# Patient Record
Sex: FEMALE | Race: WHITE | Employment: FULL TIME | URBAN - METROPOLITAN AREA
[De-identification: names, ages, dates, MRNs, and addresses within clinical notes are randomized per-mention and may not be internally consistent; named-entity substitution may affect disease eponyms.]

---

## 2017-01-03 ENCOUNTER — INITIAL PRENATAL (OUTPATIENT)
Dept: OBGYN | Facility: CLINIC | Age: 37
End: 2017-01-03
Payer: MEDICAID

## 2017-01-03 VITALS
HEIGHT: 63 IN | SYSTOLIC BLOOD PRESSURE: 114 MMHG | WEIGHT: 196 LBS | DIASTOLIC BLOOD PRESSURE: 62 MMHG | BODY MASS INDEX: 34.73 KG/M2

## 2017-01-03 DIAGNOSIS — Z34.82 ENCOUNTER FOR SUPERVISION OF OTHER NORMAL PREGNANCY IN SECOND TRIMESTER: Primary | ICD-10-CM

## 2017-01-03 LAB
APPEARANCE UR: NORMAL
BILIRUB UR STRIP-MCNC: NORMAL MG/DL
COLOR UR AUTO: NORMAL
GLUCOSE UR STRIP.AUTO-MCNC: NORMAL MG/DL
KETONES UR STRIP.AUTO-MCNC: NORMAL MG/DL
LEUKOCYTE ESTERASE UR QL STRIP.AUTO: NORMAL
NITRITE UR QL STRIP.AUTO: NORMAL
PH UR STRIP.AUTO: 5 [PH] (ref 5–8)
PROT UR QL STRIP: NORMAL MG/DL
RBC UR QL AUTO: NORMAL
SP GR UR STRIP.AUTO: 1.03
UROBILINOGEN UR STRIP-MCNC: NORMAL MG/DL

## 2017-01-03 PROCEDURE — 81002 URINALYSIS NONAUTO W/O SCOPE: CPT | Performed by: NURSE PRACTITIONER

## 2017-01-03 PROCEDURE — 59401 PR NEW OB VISIT: CPT | Performed by: NURSE PRACTITIONER

## 2017-01-03 ASSESSMENT — ENCOUNTER SYMPTOMS
EYES NEGATIVE: 1
NEUROLOGICAL NEGATIVE: 1
CONSTITUTIONAL NEGATIVE: 1
PSYCHIATRIC NEGATIVE: 1
RESPIRATORY NEGATIVE: 1
MUSCULOSKELETAL NEGATIVE: 1
GASTROINTESTINAL NEGATIVE: 1
CARDIOVASCULAR NEGATIVE: 1

## 2017-01-03 NOTE — PROGRESS NOTES
Pt here for New OB today  LMP: 9-30-16 /  done 12/7/16  Phone:  [phone]  Pharmacy verified  Last pap/result: unknown  Pt is taking PNV  Pt reports no problems  Desires AFP  Desires BTL

## 2017-01-03 NOTE — MR AVS SNAPSHOT
"        Jackelyn Morrow   1/3/2017 1:00 PM   Initial Prenatal   MRN: [id number]    [facility]   Dept Phone:  [phone]    Description:  Female : 1980   Provider:  ROSAURA Koo; PC INTAKE           Allergies as of 1/3/2017     No Known Allergies      You were diagnosed with     Encounter for supervision of other normal pregnancy in second trimester   [[id number]]  -  Primary       Vital Signs     Blood Pressure Height Weight Body Mass Index Last Menstrual Period Smoking Status    114/62 mmHg 1.6 m (5' 3\") 88.905 kg (196 lb) 34.73 kg/m2 2016 Former Smoker      Basic Information     Date Of Birth Sex Race Ethnicity Preferred Language    1980 Female White Unknown English      Problem List              ICD-10-CM Priority Class Noted - Resolved    Encounter for supervision of other normal pregnancy Z34.80   2010 - Present      Health Maintenance        Date Due Completion Dates    PAP SMEAR 1999 ---    IMM INFLUENZA (1) 2016 ---    IMM DTaP/Tdap/Td Vaccine (2 - Td) 2020            Results     POCT Urinalysis      Component Value Standard Range & Units    POC Color  Negative    POC Appearance  Negative    POC Leukocyte Esterase Neg Negative    POC Nitrites Neg Negative    POC Urobiligen  Negative (0.2) mg/dL    POC Protein Neg Negative mg/dL    POC Urine PH 5.0 5.0 - 8.0    POC Blood Neg Negative    POC Specific Gravity 1.030 <1.005 - >1.030    POC Ketones Neg Negative mg/dL    POC Biliruben  Negative mg/dL    POC Glucose Neg Negative mg/dL                        Current Immunizations     Tdap Vaccine 2010  6:00 PM      Below and/or attached are the medications your provider expects you to take. Review all of your home medications and newly ordered medications with your provider and/or pharmacist. Follow medication instructions as directed by your provider and/or pharmacist. Please keep your medication list with you and share with your " provider. Update the information when medications are discontinued, doses are changed, or new medications (including over-the-counter products) are added; and carry medication information at all times in the event of emergency situations     Allergies:  No Known Allergies          Medications  Valid as of: January 03, 2017 -  1:58 PM    Generic Name Brand Name Tablet Size Instructions for use    Acetaminophen (Tab CR) TYLENOL 650 MG Take 650 mg by mouth every 4 hours. For temperature > 100.4 degrees         Ibuprofen (Tab) MOTRIN 800 MG Take 800 mg by mouth every 8 hours as needed for Mild Pain. For cramping         Oxycodone-Acetaminophen (Tab) PERCOCET 5-325 MG Take 1-2 Tabs by mouth every four hours as needed for Mild Pain.        Prenatal Vit w/Gl-Ziyqnbkik-DJ (Tab) PNV 27-0.6-0.4 MG Take 1 Tab by mouth. Resume unchanged        .                 Medicines prescribed today were sent to:     [id number]  [place], [place] - [address] AT [facility]    [address] [place] [address]    Phone: [phone] Fax: [phone]    Open 24 Hours?: No      Medication refill instructions:       If your prescription bottle indicates you have medication refills left, it is not necessary to call your provider’s office. Please contact your pharmacy and they will refill your medication.    If your prescription bottle indicates you do not have any refills left, you may request refills at any time through one of the following ways: The online Xeround system (except Urgent Care), by calling your provider’s office, or by asking your pharmacy to contact your provider’s office with a refill request. Medication refills are processed only during regular business hours and may not be available until the next business day. Your provider may request additional information or to have a follow-up visit with you prior to refilling your medication.   *Please Note: Medication refills are assigned a new Rx  number when refilled electronically. Your pharmacy may indicate that no refills were authorized even though a new prescription for the same medication is available at the pharmacy. Please request the medicine by name with the pharmacy before contacting your provider for a refill.        Your To Do List     Future Labs/Procedures Complete By Expires    PREG CNTR PRENATAL PN  As directed 1/4/2018    THINPREP RFLX HPV ASCUS W/CTNG  As directed 1/4/2018    US-OB 2ND 3RD TRI COMPLETE  As directed 1/3/2018         [employer] Access Code: [email or web address]  Expires: 1/17/2017  8:14 AM    Your email address is not on file at [facility].  Email Addresses are required for you to sign up for [employer], please contact [phone] to verify your personal information and to provide your email address prior to attempting to register for [employer].    Jackelyn Morrow  [address] [place] [address]    Valtech Cardio  A secure, online tool to manage your health information     [facility]’s Valtech Cardio® is a secure, online tool that connects you to your personalized health information from the privacy of your home -- day or night - making it very easy for you to manage your healthcare. Once the activation process is completed, you can even access your medical information using the Valtech Cardio ihsan, which is available for free in the Apple Ihsan store or Google Play store.     To learn more about Valtech Cardio, visit [email or web address]    There are two levels of access available (as shown below):   My Chart Features  [facility] Primary Care Doctor [facility]  Specialists [facility]  Urgent  Care Non-[facility] Primary Care Doctor   Email your healthcare team securely and privately 24/7 X X X    Manage appointments: schedule your next appointment; view details of past/upcoming appointments X      Request prescription refills. X      View recent personal medical records, including lab and immunizations X X X X   View health record, including health history,  allergies, medications X X X X   Read reports about your outpatient visits, procedures, consult and ER notes X X X X   See your discharge summary, which is a recap of your hospital and/or ER visit that includes your diagnosis, lab results, and care plan X X  X     How to register for [employer]:  Once your e-mail address has been verified, follow the following steps to sign up for [employer].     1. Go to  [email or web address]  2. Click on the Sign Up Now box, which takes you to the New Member Sign Up page. You will need to provide the following information:  a. Enter your [employer] Access Code exactly as it appears at the top of this page. (You will not need to use this code after you’ve completed the sign-up process. If you do not sign up before the expiration date, you must request a new code.)   b. Enter your date of birth.   c. Enter your home email address.   d. Click Submit, and follow the next screen’s instructions.  3. Create a payByMobilet ID. This will be your [employer] Alliance login ID and cannot be changed, so think of one that is secure and easy to remember.  4. Create a [employer] Alliance password. You can change your password at any time.  5. Enter your Password Reset Question and Answer. This can be used at a later time if you forget your password.   6. Enter your e-mail address. This allows you to receive e-mail notifications when new information is available in [employer].  7. Click Sign Up. You can now view your health information.    For assistance activating your [employer] account, call [phone]

## 2017-01-03 NOTE — PROGRESS NOTES
"Subjective:      Jackelyn Morrow is a 36 y.o. female who presents with No chief complaint on file.    S:  Jackelyn Morrow is a 36 y.o.  female  @ EGA: 13w4d JOEL: Estimated Date of Delivery: 17  per  who presents for her new OB exam.  She has no complaints.  Desires AFP.  Declines CF.  Reports no FM, VB, LOF, or cramping.  Denies dysuria, vaginal DC.  Pt is  and lives with FOB. Works as .  Pregnancy is desired.  Declines Centering Pregnancy.    O:    Filed Vitals:    17 1317   BP: 114/62   Height: 1.6 m (5' 3\")   Weight: 88.905 kg (196 lb)    See H&P Prenatal Physical.  Wet mount: not done        FHTs: 155        Fundal ht: 13 cm     A:   1.  IUP @ 13w4d JOEL: Estimated Date of Delivery: 17 per          2.  S=D        3.    Patient Active Problem List    Diagnosis Date Noted   • Encounter for supervision of other normal pregnancy 2010         P:  1.  GC/CT done. Pap done.         2.  Prenatal labs ordered - lab slip given        3.  Discussed PNV, diet, and adequate water intake        4.  NOB packet given        5.  Return to office in 4 wks        6.  Complete OB US in 5-6 wks          HPI    Review of Systems   Constitutional: Negative.    HENT: Negative.    Eyes: Negative.    Respiratory: Negative.    Cardiovascular: Negative.    Gastrointestinal: Negative.    Genitourinary: Negative.         Uterus enlarged   Musculoskeletal: Negative.    Skin: Negative.    Neurological: Negative.    Endo/Heme/Allergies: Negative.    Psychiatric/Behavioral: Negative.           Objective:     /62 mmHg  Ht 1.6 m (5' 3\")  Wt 88.905 kg (196 lb)  BMI 34.73 kg/m2  LMP 2016     Physical Exam   Constitutional: She is oriented to person, place, and time. She appears well-developed and well-nourished.   HENT:   Head: Normocephalic and atraumatic.   Eyes: Pupils are equal, round, and reactive to light.   Neck: Normal range of motion. Neck supple. "   Cardiovascular: Normal rate, regular rhythm and normal heart sounds.    Pulmonary/Chest: Effort normal and breath sounds normal.   Abdominal: Soft.   Genitourinary: Vagina normal and uterus normal.   Musculoskeletal: Normal range of motion.   Neurological: She is alert and oriented to person, place, and time. She has normal reflexes.   Skin: Skin is warm and dry.   Psychiatric: She has a normal mood and affect. Her behavior is normal. Judgment and thought content normal.   Nursing note and vitals reviewed.              Assessment/Plan:     1. Encounter for supervision of other normal pregnancy in second trimester    - CONSENT FOR CYSTIC FIBROSIS CARRIER TEST  - POCT Urinalysis  - THINPREP RFLX HPV ASCUS W/CTNG; Future  - US-OB 2ND 3RD TRI COMPLETE; Future  - PREG CNTR PRENATAL PN; Future

## 2017-01-31 ENCOUNTER — ROUTINE PRENATAL (OUTPATIENT)
Dept: OBGYN | Facility: CLINIC | Age: 37
End: 2017-01-31
Payer: MEDICAID

## 2017-01-31 VITALS — DIASTOLIC BLOOD PRESSURE: 58 MMHG | BODY MASS INDEX: 34.73 KG/M2 | SYSTOLIC BLOOD PRESSURE: 112 MMHG | WEIGHT: 196 LBS

## 2017-01-31 DIAGNOSIS — Z34.90 ENCOUNTER FOR SUPERVISION OF NORMAL PREGNANCY, ANTEPARTUM, UNSPECIFIED GRAVIDITY: ICD-10-CM

## 2017-01-31 PROCEDURE — 90040 PR PRENATAL FOLLOW UP: CPT | Performed by: NURSE PRACTITIONER

## 2017-01-31 NOTE — PROGRESS NOTES
Pt here today for OB follow up  Reports light FM  WT: 196 lb  BP: 112/58  US scheduled for 02/17/17  Pt states no complaints today  Desires AFP  Good # [phone]

## 2017-01-31 NOTE — PROGRESS NOTES
S) Pt is a 36 y.o.   at 17w4d  gestation. Routine prenatal care today. Supportive FOB present and involved. Reports no problems today.  Reports possible  fetal movement. Denies cramping, bleeding or leaking of fluid. Denies dysuria. Generally feels well today. Good self-care activities identified. Denies headaches.     O) see flow         Filed Vitals:    17 0937   BP: 112/58   Weight: 88.905 kg (196 lb)           Lab:  Recent Results (from the past 336 hour(s))   ABO AND RH DETERMINATION    Collection Time: 17 12:00 AM   Result Value Ref Range    Rh Grouping Only POS     ABO Grouping Only O    ANTIBODY SCREEN    Collection Time: 17 12:00 AM   Result Value Ref Range    Antibody Screen Scrn NEG    PLATELET COUNT    Collection Time: 17 12:00 AM   Result Value Ref Range    Platelet Count 185  k/uL    RUBELLA ABS IGG    Collection Time: 17 12:00 AM   Result Value Ref Range    Rubella IgG Antibody IMMUNE    RPR QUAL W/REFLEX    Collection Time: 17 12:00 AM   Result Value Ref Range    Rapid Plasma Reagin -Rpr- NON REACTIVE    HCT    Collection Time: 17 12:00 AM   Result Value Ref Range    Hematocrit 37.1  %    HGB    Collection Time: 17 12:00 AM   Result Value Ref Range    Hemoglobin 12.7  g/dl    HIV ANTIBODIES    Collection Time: 17 12:00 AM   Result Value Ref Range    HIV 1,0,2 IC NON REACTIVE    HEP B SURFACE ANTIGEN    Collection Time: 17 12:00 AM   Result Value Ref Range    Hepatitis B Surface Antigen NEG           Pertinent ultrasound - fetal survey scheduled.            A) IUP at 17w4d       S=D         Patient Active Problem List    Diagnosis Date Noted   • Encounter for supervision of other normal pregnancy 2010     P) s/s ptl vs general discomforts. Fetal movements reviewed. General ed and anticipatory guidance. Nutrition/exercise/vitamin. Plans breast. AFP slip with explanations given. Continue PNV.

## 2017-01-31 NOTE — MR AVS SNAPSHOT
Jackelyn Morrow   2017 9:45 AM   Routine Prenatal   MRN: [id number]    Department[facility]  [facility]   Dept Phone:  [phone]    Description:  Female : 1980   Provider:  Gini Durbin D.N.P.           Allergies as of 2017     No Known Allergies      You were diagnosed with     Encounter for supervision of normal pregnancy, antepartum, unspecified    [[id number]]         Vital Signs     Blood Pressure Weight Last Menstrual Period Smoking Status          112/58 mmHg 88.905 kg (196 lb) 2016 Former Smoker        Basic Information     Date Of Birth Sex Race Ethnicity Preferred Language    1980 Female White Unknown English      Your appointments     2017  1:30 PM   US PREG 60 with PREG CTR US 1   [facility]    [facility] [address] [id number] [phone]           For [facility] patients only: 1. Please arrive 15 min prior to your appointment time. 2. If you're late, you will be rescheduled for the next available appointment. 3. If you need to reschedule your appointment, please call us at [phone] 48 hours prior to your appointment. 4. Do not bring children as they will not be allowed in exam room. 5. Only one family member may be present in room during exam. 6. The exam will be 30-60 minutes depending on the exam ordered by the physician. 7. The sonographer is not allowed to discuss findings during the exam. Your provider will go over the results with you at your next appointment. 8. The purpose of this ultrasound is to determine if baby is healthy. Diagnostic ultrasounds are NOT to determine the gender of the baby. 9. NO photography or video recording is allowed in exam room. 10. NO cell phones allowed in the exam room. INFORMACION SOBRE MOSS ULTRASONIDO 1. Por favor de llegar 15 minutos antes de moss dary. 2. Si llega tarde, le tenemos que cambiar la dary para  otra fecha. 3. Si necesita cambiar moss dary, por favor llame 48 horas antes de la dary. [phone] 4. Por favor no traer niños. No se permiten en cuarto de Ultrasonido. 5. Solamente se permite aguila persona en el cuarto elda el examen. 6. El examen dura 30-60 minutos, dependiendo del examen ordenado por el Doctor. 7. El Sonógrafo no está autorizado hablar sobre moss examen. Moss doctor o partera le va explicar los resultados en moss próxima dary. 8. El propósito del Ultrasonido es para determinar si moss marilyn viene saludable. No es para determinar el sexo de moss marilyn. 9. Por favor no fotos o cámaras de grabar. 10. No celulares permitidos en el cuarto de examen.              Problem List              ICD-10-CM Priority Class Noted - Resolved    Encounter for supervision of other normal pregnancy Z34.80   4/20/2010 - Present      Health Maintenance        Date Due Completion Dates    IMM INFLUENZA (1) 9/1/2016 ---    PAP SMEAR 1/11/2020 1/11/2017    IMM DTaP/Tdap/Td Vaccine (2 - Td) 6/24/2020 6/24/2010            Current Immunizations     Tdap Vaccine 6/24/2010  6:00 PM      Below and/or attached are the medications your provider expects you to take. Review all of your home medications and newly ordered medications with your provider and/or pharmacist. Follow medication instructions as directed by your provider and/or pharmacist. Please keep your medication list with you and share with your provider. Update the information when medications are discontinued, doses are changed, or new medications (including over-the-counter products) are added; and carry medication information at all times in the event of emergency situations     Allergies:  No Known Allergies          Medications  Valid as of: January 31, 2017 -  9:50 AM    Generic Name Brand Name Tablet Size Instructions for use    Acetaminophen (Tab CR) TYLENOL 650 MG Take 650 mg by mouth every 4 hours. For temperature > 100.4 degrees         Ibuprofen (Tab) MOTRIN 800 MG  Take 800 mg by mouth every 8 hours as needed for Mild Pain. For cramping         Oxycodone-Acetaminophen (Tab) PERCOCET 5-325 MG Take 1-2 Tabs by mouth every four hours as needed for Mild Pain.        Prenatal Vit w/Cw-Qufdagitn-NB (Tab) PNV 27-0.6-0.4 MG Take 1 Tab by mouth. Resume unchanged        .                 Medicines prescribed today were sent to:     [facility] [address] [place] [address] [place], [place] - [address] AT [facility] [address] [place] [address]    Phone: [phone] Fax: [phone]    Open 24 Hours?: No      Medication refill instructions:       If your prescription bottle indicates you have medication refills left, it is not necessary to call your provider’s office. Please contact your pharmacy and they will refill your medication.    If your prescription bottle indicates you do not have any refills left, you may request refills at any time through one of the following ways: The online Powerspan system (except Urgent Care), by calling your provider’s office, or by asking your pharmacy to contact your provider’s office with a refill request. Medication refills are processed only during regular business hours and may not be available until the next business day. Your provider may request additional information or to have a follow-up visit with you prior to refilling your medication.   *Please Note: Medication refills are assigned a new Rx number when refilled electronically. Your pharmacy may indicate that no refills were authorized even though a new prescription for the same medication is available at the pharmacy. Please request the medicine by name with the pharmacy before contacting your provider for a refill.        Your To Do List     Future Labs/Procedures Complete By Expires    AFP TETRA  As directed 2/1/2018         Powerspan Access Code: [email or web address]  Expires: 2/15/2017 12:31 PM    Your email address is not on file at [email or web address].  Email Addresses are required  for you to sign up for [employer], please contact [phone] to verify your personal information and to provide your email address prior to attempting to register for [employer].    Jackelyn Morrow  [address] [place] [address]    [employer]  A secure, online tool to manage your health information     [employer]’s [employer]® is a secure, online tool that connects you to your personalized health information from the privacy of your home -- day or night - making it very easy for you to manage your healthcare. Once the activation process is completed, you can even access your medical information using the [employer] ihsan, which is available for free in the Apple Ihsan store or Google Play store.     To learn more about [employer], visit [email or web address]    There are two levels of access available (as shown below):   My Chart Features  [facility] Doctor [facility]  Specialists [facility]  Urgent  Care Non-[facility] Primary Care Doctor   Email your healthcare team securely and privately 24/7 X X X    Manage appointments: schedule your next appointment; view details of past/upcoming appointments X      Request prescription refills. X      View recent personal medical records, including lab and immunizations X X X X   View health record, including health history, allergies, medications X X X X   Read reports about your outpatient visits, procedures, consult and ER notes X X X X   See your discharge summary, which is a recap of your hospital and/or ER visit that includes your diagnosis, lab results, and care plan X X  X     How to register for [employer]:  Once your e-mail address has been verified, follow the following steps to sign up for [employer].     1. Go to  [email or web address]  2. Click on the Sign Up Now box, which takes you to the New Member Sign Up page. You will need to provide the following information:  a. Enter your [employer] Access Code exactly as it appears at the top of this page. (You will not need to use  this code after you’ve completed the sign-up process. If you do not sign up before the expiration date, you must request a new code.)   b. Enter your date of birth.   c. Enter your home email address.   d. Click Submit, and follow the next screen’s instructions.  3. Create a Deskt ID. This will be your Deskt login ID and cannot be changed, so think of one that is secure and easy to remember.  4. Create a Deskt password. You can change your password at any time.  5. Enter your Password Reset Question and Answer. This can be used at a later time if you forget your password.   6. Enter your e-mail address. This allows you to receive e-mail notifications when new information is available in [employer].  7. Click Sign Up. You can now view your health information.    For assistance activating your [employer] account, call [phone]

## 2017-02-17 ENCOUNTER — APPOINTMENT (OUTPATIENT)
Dept: RADIOLOGY | Facility: IMAGING CENTER | Age: 37
End: 2017-02-17
Attending: NURSE PRACTITIONER
Payer: MEDICAID

## 2017-02-17 ENCOUNTER — DATING (OUTPATIENT)
Dept: OBGYN | Facility: CLINIC | Age: 37
End: 2017-02-17

## 2017-02-17 DIAGNOSIS — Z34.82 ENCOUNTER FOR SUPERVISION OF OTHER NORMAL PREGNANCY IN SECOND TRIMESTER: ICD-10-CM

## 2017-02-17 PROCEDURE — 76805 OB US >/= 14 WKS SNGL FETUS: CPT | Mod: 26 | Performed by: OBSTETRICS & GYNECOLOGY

## 2017-02-28 ENCOUNTER — ROUTINE PRENATAL (OUTPATIENT)
Dept: OBGYN | Facility: CLINIC | Age: 37
End: 2017-02-28
Payer: MEDICAID

## 2017-02-28 VITALS — BODY MASS INDEX: 35.61 KG/M2 | SYSTOLIC BLOOD PRESSURE: 116 MMHG | DIASTOLIC BLOOD PRESSURE: 62 MMHG | WEIGHT: 201 LBS

## 2017-02-28 DIAGNOSIS — Z34.82 ENCOUNTER FOR SUPERVISION OF OTHER NORMAL PREGNANCY IN SECOND TRIMESTER: ICD-10-CM

## 2017-02-28 PROCEDURE — 90040 PR PRENATAL FOLLOW UP: CPT | Performed by: NURSE PRACTITIONER

## 2017-02-28 NOTE — PROGRESS NOTES
S) Pt is a 37 y.o.   at 21w4d  gestation. Routine prenatal care today. States no problems today. Supportive FOB present and involved.  Reports good  fetal movement. Denies cramping, bleeding or leaking of fluid. Denies dysuria. Generally feels well today. Good self-care activities identified. Denies headaches.     O) see flow         Filed Vitals:    17 1329   BP: 116/62   Weight: 91.173 kg (201 lb)           Lab: normal prenatal panel       Pertinent ultrasound - normal fetal survey            A) IUP at 21w4d       S=D         Patient Active Problem List    Diagnosis Date Noted   • Encounter for supervision of other normal pregnancy 2010       P) s/s ptl vs general discomforts. Fetal movements reviewed. General ed and anticipatory guidance. Nutrition/exercise/vitamin. Plans breast. Continue PNV.

## 2017-02-28 NOTE — PROGRESS NOTES
Pt here today for OB follow up  Pt states no complaints   Reports +FM  WT:201lb  BP:116/62  Good # [phone]  Pt decided not to do afp.   Pt had u/s 2/17.

## 2017-02-28 NOTE — MR AVS SNAPSHOT
Jackelyn Morrow   2017 1:30 PM   Routine Prenatal   MRN: [id number]    [facility]   Dept Phone:  [phone]    Description:  Female : 1980   Provider:  Gini Durbin D.N.P.           Allergies as of 2017     No Known Allergies      Vital Signs     Blood Pressure Weight Last Menstrual Period Smoking Status          116/62 mmHg 91.173 kg (201 lb) 2016 Former Smoker        Basic Information     Date Of Birth Sex Race Ethnicity Preferred Language    1980 Female White Unknown English      Problem List              ICD-10-CM Priority Class Noted - Resolved    Encounter for supervision of other normal pregnancy Z34.80   2010 - Present      Health Maintenance        Date Due Completion Dates    IMM INFLUENZA (1) 2016 ---    PAP SMEAR 2020    IMM DTaP/Tdap/Td Vaccine (2 - Td) 2020            Current Immunizations     Tdap Vaccine 2010  6:00 PM      Below and/or attached are the medications your provider expects you to take. Review all of your home medications and newly ordered medications with your provider and/or pharmacist. Follow medication instructions as directed by your provider and/or pharmacist. Please keep your medication list with you and share with your provider. Update the information when medications are discontinued, doses are changed, or new medications (including over-the-counter products) are added; and carry medication information at all times in the event of emergency situations     Allergies:  No Known Allergies          Medications  Valid as of: 2017 -  1:39 PM    Generic Name Brand Name Tablet Size Instructions for use    Acetaminophen (Tab CR) TYLENOL 650 MG Take 650 mg by mouth every 4 hours. For temperature > 100.4 degrees         Ibuprofen (Tab) MOTRIN 800 MG Take 800 mg by mouth every 8 hours as needed for Mild Pain. For cramping         Oxycodone-Acetaminophen (Tab) PERCOCET  5-325 MG Take 1-2 Tabs by mouth every four hours as needed for Mild Pain.        Prenatal Vit w/Bq-Vfdgfliix-QJ (Tab) PNV 27-0.6-0.4 MG Take 1 Tab by mouth. Resume unchanged        .                 Medicines prescribed today were sent to:     [id number]  [place], [place] - [address] [facility] [address]    [address] [place] [address]    Phone: [phone] Fax: [phone]    Open 24 Hours?: No      Medication refill instructions:       If your prescription bottle indicates you have medication refills left, it is not necessary to call your provider’s office. Please contact your pharmacy and they will refill your medication.    If your prescription bottle indicates you do not have any refills left, you may request refills at any time through one of the following ways: The online Myndnet system (except Urgent Care), by calling your provider’s office, or by asking your pharmacy to contact your provider’s office with a refill request. Medication refills are processed only during regular business hours and may not be available until the next business day. Your provider may request additional information or to have a follow-up visit with you prior to refilling your medication.   *Please Note: Medication refills are assigned a new Rx number when refilled electronically. Your pharmacy may indicate that no refills were authorized even though a new prescription for the same medication is available at the pharmacy. Please request the medicine by name with the pharmacy before contacting your provider for a refill.           Myndnet Access Code: [email or web address]  Expires: 3/16/2017 12:08 PM    Your email address is not on file at [facility].  Email Addresses are required for you to sign up for Myndnet, please contact [phone] to verify your personal information and to provide your email address prior to attempting to register for Myndnet.    Jackelyn Morrow  [address] [place] [address]    TravelZeeky  A secure, online tool to manage your health information     [employer]’s Southern Swimt® is a secure, online tool that connects you to your personalized health information from the privacy of your home -- day or night - making it very easy for you to manage your healthcare. Once the activation process is completed, you can even access your medical information using the TravelZeeky ihsan, which is available for free in the Apple Ihsan store or Google Play store.     To learn more about TravelZeeky, visit [email or web address]    There are two levels of access available (as shown below):   My Chart Features  [facility] Primary Care Doctor [facility]  Specialists [facility]  Urgent  Care Non-[facility] Primary Care Doctor   Email your healthcare team securely and privately 24/7 X X X    Manage appointments: schedule your next appointment; view details of past/upcoming appointments X      Request prescription refills. X      View recent personal medical records, including lab and immunizations X X X X   View health record, including health history, allergies, medications X X X X   Read reports about your outpatient visits, procedures, consult and ER notes X X X X   See your discharge summary, which is a recap of your hospital and/or ER visit that includes your diagnosis, lab results, and care plan X X  X     How to register for TravelZeeky:  Once your e-mail address has been verified, follow the following steps to sign up for TravelZeeky.     1. Go to  [email or web address]  2. Click on the Sign Up Now box, which takes you to the New Member Sign Up page. You will need to provide the following information:  a. Enter your TravelZeeky Access Code exactly as it appears at the top of this page. (You will not need to use this code after you’ve completed the sign-up process. If you do not sign up before the expiration date, you must request a new code.)   b. Enter your date of birth.   c. Enter your home email address.   d. Click Submit, and  follow the next screen’s instructions.  3. Create a 3225 filmst ID. This will be your Compliance Assurance login ID and cannot be changed, so think of one that is secure and easy to remember.  4. Create a 3225 filmst password. You can change your password at any time.  5. Enter your Password Reset Question and Answer. This can be used at a later time if you forget your password.   6. Enter your e-mail address. This allows you to receive e-mail notifications when new information is available in Compliance Assurance.  7. Click Sign Up. You can now view your health information.    For assistance activating your Compliance Assurance account, call [phone]

## 2017-03-28 ENCOUNTER — ROUTINE PRENATAL (OUTPATIENT)
Dept: OBGYN | Facility: CLINIC | Age: 37
End: 2017-03-28
Payer: MEDICAID

## 2017-03-28 VITALS — WEIGHT: 203 LBS | DIASTOLIC BLOOD PRESSURE: 64 MMHG | SYSTOLIC BLOOD PRESSURE: 110 MMHG | BODY MASS INDEX: 35.97 KG/M2

## 2017-03-28 DIAGNOSIS — Z34.80 PRENATAL CARE, SUBSEQUENT PREGNANCY, UNSPECIFIED TRIMESTER: ICD-10-CM

## 2017-03-28 PROCEDURE — 90040 PR PRENATAL FOLLOW UP: CPT | Performed by: NURSE PRACTITIONER

## 2017-03-28 NOTE — PROGRESS NOTES
S) Pt is a 37 y.o.   at 25w4d  gestation. Routine prenatal care today. States no problems today.  Reports good  fetal movement. Denies cramping, bleeding or leaking of fluid. Denies dysuria. Generally feels well today. Good self-care activities identified. Denies headaches.     O) see flow         Filed Vitals:    17 1606   BP: 110/64   Weight: 92.08 kg (203 lb)           Lab: normal prenatal panel,        Pertinent ultrasound - normal fetal survey          A) IUP at 25w4d       S=D         Patient Active Problem List    Diagnosis Date Noted   • Encounter for supervision of other normal pregnancy 2010           P) s/s ptl vs general discomforts. Fetal movements reviewed. General ed and anticipatory guidance. Nutrition/exercise/vitamin. Plans breast or bottle?? Plans pp contraception?? Resources WIC, food stamps etc reviewed. Flu shot recommended. Continue PNV.

## 2017-03-28 NOTE — MR AVS SNAPSHOT
Jackelyn Morrow   3/28/2017 4:15 PM   Routine Prenatal   MRN: [id number]    [facility]   Dept Phone:  [phone]    Description:  Female : 1980   Provider:  Gini Durbin D.N.P.           Allergies as of 3/28/2017     No Known Allergies      You were diagnosed with     Prenatal care, subsequent pregnancy, unspecified trimester   [286793]         Vital Signs     Blood Pressure Weight Last Menstrual Period Smoking Status          110/64 mmHg 92.08 kg (203 lb) 2016 Former Smoker        Basic Information     Date Of Birth Sex Race Ethnicity Preferred Language    1980 Female White Unknown English      Problem List              ICD-10-CM Priority Class Noted - Resolved    Encounter for supervision of other normal pregnancy Z34.80   2010 - Present      Health Maintenance        Date Due Completion Dates    IMM INFLUENZA (1) 2016 ---    PAP SMEAR 2020    IMM DTaP/Tdap/Td Vaccine (2 - Td) 2020            Current Immunizations     Tdap Vaccine 2010  6:00 PM      Below and/or attached are the medications your provider expects you to take. Review all of your home medications and newly ordered medications with your provider and/or pharmacist. Follow medication instructions as directed by your provider and/or pharmacist. Please keep your medication list with you and share with your provider. Update the information when medications are discontinued, doses are changed, or new medications (including over-the-counter products) are added; and carry medication information at all times in the event of emergency situations     Allergies:  No Known Allergies          Medications  Valid as of: 2017 -  4:35 PM    Generic Name Brand Name Tablet Size Instructions for use    Acetaminophen (Tab CR) TYLENOL 650 MG Take 650 mg by mouth every 4 hours. For temperature > 100.4 degrees         Ibuprofen (Tab) MOTRIN 800 MG Take 800 mg by mouth  every 8 hours as needed for Mild Pain. For cramping         Oxycodone-Acetaminophen (Tab) PERCOCET 5-325 MG Take 1-2 Tabs by mouth every four hours as needed for Mild Pain.        Prenatal Vit w/Vk-Iomyafuqm-HJ (Tab) PNV 27-0.6-0.4 MG Take 1 Tab by mouth. Resume unchanged        .                 Medicines prescribed today were sent to:     [id number]  [place], [place] - [address] AT [facility] [address] [place] [address]    Phone: [phone] Fax: [phone]    Open 24 Hours?: No      Medication refill instructions:       If your prescription bottle indicates you have medication refills left, it is not necessary to call your provider’s office. Please contact your pharmacy and they will refill your medication.    If your prescription bottle indicates you do not have any refills left, you may request refills at any time through one of the following ways: The online Springshot system (except Urgent Care), by calling your provider’s office, or by asking your pharmacy to contact your provider’s office with a refill request. Medication refills are processed only during regular business hours and may not be available until the next business day. Your provider may request additional information or to have a follow-up visit with you prior to refilling your medication.   *Please Note: Medication refills are assigned a new Rx number when refilled electronically. Your pharmacy may indicate that no refills were authorized even though a new prescription for the same medication is available at the pharmacy. Please request the medicine by name with the pharmacy before contacting your provider for a refill.        Your To Do List     Future Labs/Procedures Complete By Expires    GLUCOSE 1HR GESTATIONAL  As directed 3/28/2018    HCT  As directed 3/28/2018    HGB  As directed 3/28/2018    T.PALLIDUM AB EIA  As directed 3/28/2018         Springshot Access Code: [email or web address]  Expires:  4/14/2017 12:29 PM    Your email address is not on file at [facility].  Email Addresses are required for you to sign up for [employer], please contact [phone] to verify your personal information and to provide your email address prior to attempting to register for [employer].    Jackelyn Morrow  [address] [place] [address]    [employer]  A secure, online tool to manage your health information     [employer]’s Isolation Network® is a secure, online tool that connects you to your personalized health information from the privacy of your home -- day or night - making it very easy for you to manage your healthcare. Once the activation process is completed, you can even access your medical information using the Is[employer] Network ihsan, which is available for free in the Apple Ihsan store or Google Play store.     To learn more about Isolation Network, visit [email or web address]    There are two levels of access available (as shown below):   My Chart Features  [facility] Primary Care Doctor [facility]  Specialists [facility]  Urgent  Care Non-[facility] Primary Care Doctor   Email your healthcare team securely and privately 24/7 X X X    Manage appointments: schedule your next appointment; view details of past/upcoming appointments X      Request prescription refills. X      View recent personal medical records, including lab and immunizations X X X X   View health record, including health history, allergies, medications X X X X   Read reports about your outpatient visits, procedures, consult and ER notes X X X X   See your discharge summary, which is a recap of your hospital and/or ER visit that includes your diagnosis, lab results, and care plan X X  X     How to register for [employer]:  Once your e-mail address has been verified, follow the following steps to sign up for [employer].     1. Go to  [email or web address]  2. Click on the Sign Up Now box, which takes you to the New Member Sign Up page. You will need to provide the following  information:  a. Enter your [employer] Access Code exactly as it appears at the top of this page. (You will not need to use this code after you’ve completed the sign-up process. If you do not sign up before the expiration date, you must request a new code.)   b. Enter your date of birth.   c. Enter your home email address.   d. Click Submit, and follow the next screen’s instructions.  3. Create a Cephasonics ID. This will be your Cephasonics login ID and cannot be changed, so think of one that is secure and easy to remember.  4. Create a Ce[employer]ics password. You can change your password at any time.  5. Enter your Password Reset Question and Answer. This can be used at a later time if you forget your password.   6. Enter your e-mail address. This allows you to receive e-mail notifications when new information is available in Ce[employer]ics.  7. Click Sign Up. You can now view your health information.    For assistance activating your Ce[employer]ics account, call [phone]

## 2017-03-28 NOTE — PROGRESS NOTES
Pt here today for OB follow up  Pt states no complaints   Reports +FM  WT:203lb  BP:110/64  Good # [phone]  Pt given 1 hr gtt and instructions.

## 2017-04-06 LAB
GLUCOSE 1H P 50 G GLC PO SERPL-MCNC: 112 MG/DL
HCT VFR BLD AUTO: 36.8 %
HGB BLD-MCNC: 12 G/DL
RPR SER QL: NOT DETECTED

## 2017-04-19 ENCOUNTER — ROUTINE PRENATAL (OUTPATIENT)
Dept: OBGYN | Facility: CLINIC | Age: 37
End: 2017-04-19
Payer: MEDICAID

## 2017-04-19 VITALS — DIASTOLIC BLOOD PRESSURE: 70 MMHG | BODY MASS INDEX: 36.5 KG/M2 | WEIGHT: 206 LBS | SYSTOLIC BLOOD PRESSURE: 120 MMHG

## 2017-04-19 DIAGNOSIS — Z34.83 PRENATAL CARE, SUBSEQUENT PREGNANCY, THIRD TRIMESTER: ICD-10-CM

## 2017-04-19 PROCEDURE — 90715 TDAP VACCINE 7 YRS/> IM: CPT | Performed by: NURSE PRACTITIONER

## 2017-04-19 PROCEDURE — 90040 PR PRENATAL FOLLOW UP: CPT | Performed by: NURSE PRACTITIONER

## 2017-04-19 PROCEDURE — 90471 IMMUNIZATION ADMIN: CPT | Performed by: NURSE PRACTITIONER

## 2017-04-19 NOTE — Clinical Note
"Count Your Baby's Movements  Another step to a healthy delivery    Jackelyn Morrow             Dept: [phone]    How Many Weeks Pregnant? 28w5d    Date to Begin Countin17              How to use this chart    One way for your physician to keep track of your baby's health is by knowing how often the baby moves (or \"kicks\") in your womb.  You can help your physician to do this by using this chart every day.    Every day, you should see how many hours it takes for your baby to move 10 times.  Start in the morning, as soon as you get up.    · First, write down the time your baby moves until you get to 10.  · Check off one box every time your baby moves until you get to 10.  · Write down the time you finished counting in the last column.  · Total how long it took to count up all 10 movements.  · Finally, fill in the box that shows how long this took.  After counting 10 movements, you no longer have to count any more that day.  The next morning, just start counting again as soon as you get up.    What should you call a \"movement\"?  It is hard to say, because it will feel different from one mother to another and from one pregnancy to the next.  The important thing is that you count the movements the same way throughout your pregnancy.  If you have more questions, you should ask your physician.    Count carefully every day!  SAMPLE:  Week 28    How many hours did it take to feel 10 movements?       Start  Time     1     2     3     4     5     6     7     8     9     10   Finish Time   Mon 8:20 ·  ·  ·  ·  ·  ·  ·  ·  ·  ·  11:40                  Sat               Sun                 IMPORTANT: You should contact your physician if it takes more than two hours for you to feel 10 movements.  Each morning, write down the time and start to count the movements of your baby.  Keep track by checking off one box every time you feel one movement.  When you have felt " "10 \"kicks\", write down the time you finished counting in the last column.  Then fill in the   box (over the check terrell) for the number of hours it took.  Be sure to read the complete instructions on the previous page.            "

## 2017-04-19 NOTE — PROGRESS NOTES
Pt here today for OB follow up  Pt states no complaints   Reports +FM  WT:206lb  BP:120/70  Good # [phone]  Pt given franck sheet and instructions   Pt would like Tdap.   Tdap vaccine given. right Deltoid. VIS given and screening check list reviewed with pt.  Pt would like btl. consent signed

## 2017-04-19 NOTE — PROGRESS NOTES
S) Pt is a 37 y.o.   at 28w5d  gestation. Routine prenatal care today. States no problems today except for vaginal pressure.  Reports good  fetal movement. Denies cramping, bleeding or leaking of fluid. Denies dysuria. Generally feels well today. Good self-care activities identified. Denies headaches.     O) see flow         Filed Vitals:    17 1440   BP: 120/70   Weight: 93.441 kg (206 lb)           Lab:  Recent Results (from the past 336 hour(s))   GLUCOSE 1HR GESTATIONAL    Collection Time: 17 12:00 AM   Result Value Ref Range    Glucose, Post Dose 112    RPR QUAL W/REFLEX    Collection Time: 17 12:00 AM   Result Value Ref Range    Rapid Plasma Reagin -Rpr- Not Detected    HCT    Collection Time: 17 12:00 AM   Result Value Ref Range    Hematocrit 36.8    HGB    Collection Time: 17 12:00 AM   Result Value Ref Range    Hemoglobin 12.0           Pertinent ultrasound -        Normal fetal survey     A) IUP at 28w5d       S=D         Patient Active Problem List    Diagnosis Date Noted   • Encounter for supervision of other normal pregnancy 2010     P) s/s ptl vs general discomforts. Fetal movements reviewed. General ed and anticipatory guidance. Nutrition/exercise/vitamin. Plans breast. Plans pp contraception - BTL. Continue PNV. TDAP today. BTL consent signed.

## 2017-04-19 NOTE — MR AVS SNAPSHOT
Jackelyn Morrow   2017 2:45 PM   Routine Prenatal   MRN: [id number]    [facility]   Dept Phone:  [phone]    Description:  Female : 1980   Provider:  Gini Durbin D.N.P.           Allergies as of 2017     No Known Allergies      You were diagnosed with     Prenatal care, subsequent pregnancy, third trimester   [631783]         Vital Signs     Blood Pressure Weight Last Menstrual Period Smoking Status          120/70 mmHg 93.441 kg (206 lb) 2016 Former Smoker        Basic Information     Date Of Birth Sex Race Ethnicity Preferred Language    1980 Female White Unknown English      Problem List              ICD-10-CM Priority Class Noted - Resolved    Encounter for supervision of other normal pregnancy Z34.80   2010 - Present      Health Maintenance        Date Due Completion Dates    PAP SMEAR 2020    IMM DTaP/Tdap/Td Vaccine (2 - Td) 2020            Current Immunizations     Tdap Vaccine  Incomplete, 2010  6:00 PM      Below and/or attached are the medications your provider expects you to take. Review all of your home medications and newly ordered medications with your provider and/or pharmacist. Follow medication instructions as directed by your provider and/or pharmacist. Please keep your medication list with you and share with your provider. Update the information when medications are discontinued, doses are changed, or new medications (including over-the-counter products) are added; and carry medication information at all times in the event of emergency situations     Allergies:  No Known Allergies          Medications  Valid as of: 2017 -  2:55 PM    Generic Name Brand Name Tablet Size Instructions for use    Acetaminophen (Tab CR) TYLENOL 650 MG Take 650 mg by mouth every 4 hours. For temperature > 100.4 degrees         Ibuprofen (Tab) MOTRIN 800 MG Take 800 mg by mouth every 8 hours as needed for  Mild Pain. For cramping         Oxycodone-Acetaminophen (Tab) PERCOCET 5-325 MG Take 1-2 Tabs by mouth every four hours as needed for Mild Pain.        Prenatal Vit w/Py-Phgufwlzy-FQ (Tab) PNV 27-0.6-0.4 MG Take 1 Tab by mouth. Resume unchanged        .                 Medicines prescribed today were sent to:     [facility] [address] [place] [address] [place], [place] - [address] AT [facility] [address] [place] [address]    Phone: [phone] Fax: [phone]    Open 24 Hours?: No      Medication refill instructions:       If your prescription bottle indicates you have medication refills left, it is not necessary to call your provider’s office. Please contact your pharmacy and they will refill your medication.    If your prescription bottle indicates you do not have any refills left, you may request refills at any time through one of the following ways: The online Scout Analytics system (except Urgent Care), by calling your provider’s office, or by asking your pharmacy to contact your provider’s office with a refill request. Medication refills are processed only during regular business hours and may not be available until the next business day. Your provider may request additional information or to have a follow-up visit with you prior to refilling your medication.   *Please Note: Medication refills are assigned a new Rx number when refilled electronically. Your pharmacy may indicate that no refills were authorized even though a new prescription for the same medication is available at the pharmacy. Please request the medicine by name with the pharmacy before contacting your provider for a refill.           Scout Analytics Access Code: [email or web address]  Expires: 5/19/2017  2:55 PM    Your email address is not on file at [facility].  Email Addresses are required for you to sign up for Scout Analytics, please contact [phone] to verify your personal information and to provide your email address prior to attempting to  register for Navdyt.    Jackelyn Morrow  [address] [facility] [place] [address]    [employer]  A secure, online tool to manage your health information     [employer]’s Navdyt® is a secure, online tool that connects you to your personalized health information from the privacy of your home -- day or night - making it very easy for you to manage your healthcare. Once the activation process is completed, you can even access your medical information using the Yamsafer ihsan, which is available for free in the Apple Ihsan store or Google Play store.     To learn more about Yamsafer, visit [email or web address]    There are two levels of access available (as shown below):   My Chart Features  [facility] Primary Care Doctor [facility]  Specialists [facility]  Urgent  Care Non-[facility] Primary Care Doctor   Email your healthcare team securely and privately 24/7 X X X    Manage appointments: schedule your next appointment; view details of past/upcoming appointments X      Request prescription refills. X      View recent personal medical records, including lab and immunizations X X X X   View health record, including health history, allergies, medications X X X X   Read reports about your outpatient visits, procedures, consult and ER notes X X X X   See your discharge summary, which is a recap of your hospital and/or ER visit that includes your diagnosis, lab results, and care plan X X  X     How to register for Navdyt:  Once your e-mail address has been verified, follow the following steps to sign up for Navdyt.     1. Go to  [email or web address]  2. Click on the Sign Up Now box, which takes you to the New Member Sign Up page. You will need to provide the following information:  a. Enter your Yamsafer Access Code exactly as it appears at the top of this page. (You will not need to use this code after you’ve completed the sign-up process. If you do not sign up before the expiration date, you must request a new code.)   b. Enter your date  of birth.   c. Enter your home email address.   d. Click Submit, and follow the next screen’s instructions.  3. Create a Loop App ID. This will be your Loop App login ID and cannot be changed, so think of one that is secure and easy to remember.  4. Create a Thanxt password. You can change your password at any time.  5. Enter your Password Reset Question and Answer. This can be used at a later time if you forget your password.   6. Enter your e-mail address. This allows you to receive e-mail notifications when new information is available in Loop App.  7. Click Sign Up. You can now view your health information.    For assistance activating your Loop App account, call [phone]

## 2017-05-04 ENCOUNTER — ROUTINE PRENATAL (OUTPATIENT)
Dept: OBGYN | Facility: CLINIC | Age: 37
End: 2017-05-04
Payer: MEDICAID

## 2017-05-04 VITALS — DIASTOLIC BLOOD PRESSURE: 70 MMHG | BODY MASS INDEX: 36.85 KG/M2 | SYSTOLIC BLOOD PRESSURE: 124 MMHG | WEIGHT: 208 LBS

## 2017-05-04 DIAGNOSIS — Z34.80 ENCOUNTER FOR SUPERVISION OF OTHER NORMAL PREGNANCY: Primary | ICD-10-CM

## 2017-05-04 PROCEDURE — 90040 PR PRENATAL FOLLOW UP: CPT | Performed by: NURSE PRACTITIONER

## 2017-05-04 NOTE — PATIENT INSTRUCTIONS
1.  PP contraception: BTL.        2.  Continue FKCs.          3.  Questions answered.          4.  Encouraged pt to tour L&D.          5.  Encourage adequate water intake.        6.  F/u 2 wks.

## 2017-05-04 NOTE — PROGRESS NOTES
S:  Pt is  at 30w6d for routine OB follow up.  No concerns today.  Reports good FM.  Denies VB, LOF, RUCs or vaginal DC.    O:  Please see above vitals.        FHTs: 145        Fundal ht: 30 cm.        S=D        1hr GTT: 112 -- reviewed w pt.    A:  IUP at 30w6d  Patient Active Problem List    Diagnosis Date Noted   • Encounter for supervision of other normal pregnancy 2010        P:  1.  PP contraception: BTL.        2.  Continue FKCs.          3.  Questions answered.          4.  Encouraged pt to tour L&D.          5.  Encourage adequate water intake.        6.  F/u 2 wks.

## 2017-05-04 NOTE — MR AVS SNAPSHOT
Jackelyn Morrow   2017 1:45 PM   Routine Prenatal   MRN: [id number]    [facility]   Dept Phone:  [phone]    Description:  Female : 1980   Provider:  ROSAURA Koo           Allergies as of 2017     No Known Allergies      Vital Signs     Blood Pressure Weight Last Menstrual Period Smoking Status          124/70 mmHg 94.348 kg (208 lb) 2016 Former Smoker        Basic Information     Date Of Birth Sex Race Ethnicity Preferred Language    1980 Female White Unknown English      Problem List              ICD-10-CM Priority Class Noted - Resolved    Encounter for supervision of other normal pregnancy Z34.80   2010 - Present      Health Maintenance        Date Due Completion Dates    PAP SMEAR 2020    IMM DTaP/Tdap/Td Vaccine (3 - Td) 2027, 2010            Current Immunizations     Tdap Vaccine 2017  2:51 PM, 2010  6:00 PM      Below and/or attached are the medications your provider expects you to take. Review all of your home medications and newly ordered medications with your provider and/or pharmacist. Follow medication instructions as directed by your provider and/or pharmacist. Please keep your medication list with you and share with your provider. Update the information when medications are discontinued, doses are changed, or new medications (including over-the-counter products) are added; and carry medication information at all times in the event of emergency situations     Allergies:  No Known Allergies          Medications  Valid as of: May 04, 2017 -  2:08 PM    Generic Name Brand Name Tablet Size Instructions for use    Acetaminophen (Tab CR) TYLENOL 650 MG Take 650 mg by mouth every 4 hours. For temperature > 100.4 degrees         Ibuprofen (Tab) MOTRIN 800 MG Take 800 mg by mouth every 8 hours as needed for Mild Pain. For cramping         Oxycodone-Acetaminophen (Tab) PERCOCET 5-325 MG Take  1-2 Tabs by mouth every four hours as needed for Mild Pain.        Prenatal Vit w/Qd-Rfteovxrj-QZ (Tab) PNV 27-0.6-0.4 MG Take 1 Tab by mouth. Resume unchanged        .                 Medicines prescribed today were sent to:     [id number]  [place], [place] - [address] AT [facility] [address]    [address] [place] [address]    Phone: [phone] Fax: [phone]    Open 24 Hours?: No      Medication refill instructions:       If your prescription bottle indicates you have medication refills left, it is not necessary to call your provider’s office. Please contact your pharmacy and they will refill your medication.    If your prescription bottle indicates you do not have any refills left, you may request refills at any time through one of the following ways: The online Wanna Migrate system (except Urgent Care), by calling your provider’s office, or by asking your pharmacy to contact your provider’s office with a refill request. Medication refills are processed only during regular business hours and may not be available until the next business day. Your provider may request additional information or to have a follow-up visit with you prior to refilling your medication.   *Please Note: Medication refills are assigned a new Rx number when refilled electronically. Your pharmacy may indicate that no refills were authorized even though a new prescription for the same medication is available at the pharmacy. Please request the medicine by name with the pharmacy before contacting your provider for a refill.        Instructions          1.  PP contraception: BTL.        2.  Continue FKCs.          3.  Questions answered.          4.  Encouraged pt to tour L&D.          5.  Encourage adequate water intake.        6.  F/u 2 wks.                 Other Notes About Your Plan     Baby Elidia           Wanna Migrate Access Code: [email or web address]  Expires: 5/19/2017  2:55 PM    Your email address is not on file at  [facility].  Email Addresses are required for you to sign up for Spectrum Mobile, please contact [phone] to verify your personal information and to provide your email address prior to attempting to register for Spectrum Mobile.    Jackelyn Morrow  [address] [place] [address]    Spectrum Mobile  A secure, online tool to manage your health information     [employer]’s Spectrum Mobile® is a secure, online tool that connects you to your personalized health information from the privacy of your home -- day or night - making it very easy for you to manage your healthcare. Once the activation process is completed, you can even access your medical information using the Spectrum Mobile ihsan, which is available for free in the Apple Ihsan store or Google Play store.     To learn more about Spectrum Mobile, visit [email or web address]    There are two levels of access available (as shown below):   My Chart Features  [facility] Primary Care Doctor [facility]  Specialists [facility]  Urgent  Care [facility] Primary Care Doctor   Email your healthcare team securely and privately 24/7 X X X    Manage appointments: schedule your next appointment; view details of past/upcoming appointments X      Request prescription refills. X      View recent personal medical records, including lab and immunizations X X X X   View health record, including health history, allergies, medications X X X X   Read reports about your outpatient visits, procedures, consult and ER notes X X X X   See your discharge summary, which is a recap of your hospital and/or ER visit that includes your diagnosis, lab results, and care plan X X  X     How to register for Spectrum Mobile:  Once your e-mail address has been verified, follow the following steps to sign up for Spectrum Mobile.     1. Go to  [email or web address]  2. Click on the Sign Up Now box, which takes you to the New Member Sign Up page. You will need to provide the following information:  a. Enter your Spectrum Mobile Access Code exactly as it appears at the  top of this page. (You will not need to use this code after you’ve completed the sign-up process. If you do not sign up before the expiration date, you must request a new code.)   b. Enter your date of birth.   c. Enter your home email address.   d. Click Submit, and follow the next screen’s instructions.  3. Create a Obalon Therapeutics ID. This will be your Ob[employer] Therapeutics login ID and cannot be changed, so think of one that is secure and easy to remember.  4. Create a Ob[employer]on Therapeutics password. You can change your password at any time.  5. Enter your Password Reset Question and Answer. This can be used at a later time if you forget your password.   6. Enter your e-mail address. This allows you to receive e-mail notifications when new information is available in Obalon Therapeutics.  7. Click Sign Up. You can now view your health information.    For assistance activating your Obalon Therapeutics account, call [phone]

## 2017-05-04 NOTE — PROGRESS NOTES
Pt here today for OB follow up  Pt states no complaints   Reports +FM  WT:208lb  BP:124/70  Good # [phone]

## 2017-05-16 ENCOUNTER — ROUTINE PRENATAL (OUTPATIENT)
Dept: OBGYN | Facility: CLINIC | Age: 37
End: 2017-05-16
Payer: MEDICAID

## 2017-05-16 VITALS — DIASTOLIC BLOOD PRESSURE: 70 MMHG | BODY MASS INDEX: 36.85 KG/M2 | WEIGHT: 208 LBS | SYSTOLIC BLOOD PRESSURE: 120 MMHG

## 2017-05-16 DIAGNOSIS — Z34.80 ENCOUNTER FOR SUPERVISION OF OTHER NORMAL PREGNANCY: Primary | ICD-10-CM

## 2017-05-16 PROCEDURE — 90040 PR PRENATAL FOLLOW UP: CPT | Performed by: NURSE PRACTITIONER

## 2017-05-16 NOTE — MR AVS SNAPSHOT
Jackelyn Morrow   2017 10:00 AM   Routine Prenatal   MRN: [id number]    Department[facility]   Dept Phone:  [phone]    Description:  Female : 1980   Provider:  ROSAURA Koo           Allergies as of 2017     No Known Allergies      You were diagnosed with     Encounter for supervision of other normal pregnancy   [6666801]  -  Primary       Vital Signs     Blood Pressure Weight Last Menstrual Period Smoking Status          120/70 mmHg 94.348 kg (208 lb) 2016 Former Smoker        Basic Information     Date Of Birth Sex Race Ethnicity Preferred Language    1980 Female White Unknown English      Problem List              ICD-10-CM Priority Class Noted - Resolved    Encounter for supervision of other normal pregnancy Z34.80   2010 - Present      Health Maintenance        Date Due Completion Dates    PAP SMEAR 2020    IMM DTaP/Tdap/Td Vaccine (3 - Td) 2027, 2010            Current Immunizations     Tdap Vaccine 2017  2:51 PM, 2010  6:00 PM      Below and/or attached are the medications your provider expects you to take. Review all of your home medications and newly ordered medications with your provider and/or pharmacist. Follow medication instructions as directed by your provider and/or pharmacist. Please keep your medication list with you and share with your provider. Update the information when medications are discontinued, doses are changed, or new medications (including over-the-counter products) are added; and carry medication information at all times in the event of emergency situations     Allergies:  No Known Allergies          Medications  Valid as of: May 16, 2017 - 10:16 AM    Generic Name Brand Name Tablet Size Instructions for use    Acetaminophen (Tab CR) TYLENOL 650 MG Take 650 mg by mouth every 4 hours. For temperature > 100.4 degrees         Ibuprofen (Tab) MOTRIN 800 MG Take 800 mg by mouth  every 8 hours as needed for Mild Pain. For cramping         Oxycodone-Acetaminophen (Tab) PERCOCET 5-325 MG Take 1-2 Tabs by mouth every four hours as needed for Mild Pain.        Prenatal Vit w/Zs-Cxcayhguy-WY (Tab) PNV 27-0.6-0.4 MG Take 1 Tab by mouth. Resume unchanged        .                 Medicines prescribed today were sent to:     [facility] [id number]  [place], [place] - [address] AT [facility] [address] [place] [address]    Phone: [phone] Fax: [phone]    Open 24 Hours?: No      Medication refill instructions:       If your prescription bottle indicates you have medication refills left, it is not necessary to call your provider’s office. Please contact your pharmacy and they will refill your medication.    If your prescription bottle indicates you do not have any refills left, you may request refills at any time through one of the following ways: The online LiquidText system (except Urgent Care), by calling your provider’s office, or by asking your pharmacy to contact your provider’s office with a refill request. Medication refills are processed only during regular business hours and may not be available until the next business day. Your provider may request additional information or to have a follow-up visit with you prior to refilling your medication.   *Please Note: Medication refills are assigned a new Rx number when refilled electronically. Your pharmacy may indicate that no refills were authorized even though a new prescription for the same medication is available at the pharmacy. Please request the medicine by name with the pharmacy before contacting your provider for a refill.        Instructions          1.  GBS @ 35 wks.          2.  Continue FKCs.          3.  Questions answered.          4.  Encouraged pt to tour L&D.          5.  Encourage adequate water intake.        6.  F/u 2 wks.                 Other Notes About Your Plan     Baby Elidia            Credit Benchmark Access Code: [email or web address]  Expires: 5/19/2017  2:55 PM    Your email address is not on file at [facility].  Email Addresses are required for you to sign up for Credit Benchmark, please contact [phone] to verify your personal information and to provide your email address prior to attempting to register for Credit Benchmark.    Jackelyn Morrow  [address] [place] [address]    Credit Benchmark  A secure, online tool to manage your health information     [facility] [employer]’s Credit Benchmark® is a secure, online tool that connects you to your personalized health information from the privacy of your home -- day or night - making it very easy for you to manage your healthcare. Once the activation process is completed, you can even access your medical information using the Credit Benchmark ihsan, which is available for free in the Apple Ihsan store or Google Play store.     To learn more about Credit Benchmark, visit www.Identica Holdings/Exchange Corporationt    There are two levels of access available (as shown below):   My Chart Features  [facility] Primary Care Doctor [facility]  Specialists [facility]  Urgent  Care Non-[facility] Primary Care Doctor   Email your healthcare team securely and privately 24/7 X X X    Manage appointments: schedule your next appointment; view details of past/upcoming appointments X      Request prescription refills. X      View recent personal medical records, including lab and immunizations X X X X   View health record, including health history, allergies, medications X X X X   Read reports about your outpatient visits, procedures, consult and ER notes X X X X   See your discharge summary, which is a recap of your hospital and/or ER visit that includes your diagnosis, lab results, and care plan X X  X     How to register for Credit Benchmark:  Once your e-mail address has been verified, follow the following steps to sign up for Credit Benchmark.     1. Go to  [email or web address]  2. Click on the Sign Up Now box, which takes you to the New Member Sign Up page. You will  need to provide the following information:  a. Enter your iPosition Access Code exactly as it appears at the top of this page. (You will not need to use this code after you’ve completed the sign-up process. If you do not sign up before the expiration date, you must request a new code.)   b. Enter your date of birth.   c. Enter your home email address.   d. Click Submit, and follow the next screen’s instructions.  3. Create a Acetylon Pharmaceuticalst ID. This will be your iPosition login ID and cannot be changed, so think of one that is secure and easy to remember.  4. Create a iPosition password. You can change your password at any time.  5. Enter your Password Reset Question and Answer. This can be used at a later time if you forget your password.   6. Enter your e-mail address. This allows you to receive e-mail notifications when new information is available in iPosition.  7. Click Sign Up. You can now view your health information.    For assistance activating your iPosition account, call [phone]

## 2017-05-16 NOTE — PATIENT INSTRUCTIONS
1.  GBS @ 35 wks.          2.  Continue FKCs.          3.  Questions answered.          4.  Encouraged pt to tour L&D.          5.  Encourage adequate water intake.        6.  F/u 2 wks.

## 2017-05-16 NOTE — PROGRESS NOTES
Pt here for OB/FU Reports Good Fetal Movement.  Pt states no complications today.   # [phone]

## 2017-05-16 NOTE — PROGRESS NOTES
S:  Pt is  at 32w4d for routine OB follow up.  No concerns today.  Reports good FM.  Denies VB, LOF, RUCs or vaginal DC.    O:  Please see above vitals.        FHTs: 145        Fundal ht: 32 cm.        S=D    A:  IUP at 32w4d  Patient Active Problem List    Diagnosis Date Noted   • Encounter for supervision of other normal pregnancy 2010        P:  1.  GBS @ 35 wks.          2.  Continue FKCs.          3.  Questions answered.          4.  Encouraged pt to tour L&D.          5.  Encourage adequate water intake.        6.  F/u 2 wks.

## 2017-05-31 ENCOUNTER — ROUTINE PRENATAL (OUTPATIENT)
Dept: OBGYN | Facility: CLINIC | Age: 37
End: 2017-05-31
Payer: MEDICAID

## 2017-05-31 VITALS — SYSTOLIC BLOOD PRESSURE: 120 MMHG | DIASTOLIC BLOOD PRESSURE: 76 MMHG | BODY MASS INDEX: 37.56 KG/M2 | WEIGHT: 212 LBS

## 2017-05-31 DIAGNOSIS — Z34.80 ENCOUNTER FOR SUPERVISION OF OTHER NORMAL PREGNANCY: Primary | ICD-10-CM

## 2017-05-31 PROCEDURE — 90040 PR PRENATAL FOLLOW UP: CPT | Performed by: NURSE PRACTITIONER

## 2017-05-31 NOTE — PROGRESS NOTES
Pt. Here for OB/FU today. Reports Good FM.   Good # [phone]  Pt states having contractions that are getting stronger and consistent.    Pharmacy verified.

## 2017-05-31 NOTE — MR AVS SNAPSHOT
Jackelyn Morrow   2017 3:15 PM   Routine Prenatal   MRN: [id number]    [facility]   Dept Phone:  [phone]    Description:  Female : 1980   Provider:  Christy Neal C.N.M.           Allergies as of 2017     No Known Allergies      You were diagnosed with     Encounter for supervision of other normal pregnancy   [5890585]  -  Primary       Vital Signs     Blood Pressure Weight Last Menstrual Period Smoking Status          120/76 mmHg 96.163 kg (212 lb) 2016 Former Smoker        Basic Information     Date Of Birth Sex Race Ethnicity Preferred Language    1980 Female White Unknown English      Problem List              ICD-10-CM Priority Class Noted - Resolved    Encounter for supervision of other normal pregnancy Z34.80   2010 - Present      Health Maintenance        Date Due Completion Dates    PAP SMEAR 2020    IMM DTaP/Tdap/Td Vaccine (3 - Td) 2027, 2010            Current Immunizations     Tdap Vaccine 2017  2:51 PM, 2010  6:00 PM      Below and/or attached are the medications your provider expects you to take. Review all of your home medications and newly ordered medications with your provider and/or pharmacist. Follow medication instructions as directed by your provider and/or pharmacist. Please keep your medication list with you and share with your provider. Update the information when medications are discontinued, doses are changed, or new medications (including over-the-counter products) are added; and carry medication information at all times in the event of emergency situations     Allergies:  No Known Allergies          Medications  Valid as of: May 31, 2017 -  3:26 PM    Generic Name Brand Name Tablet Size Instructions for use    Prenatal Vit w/Lw-Svltlnwih-HM (Tab) PNV 27-0.6-0.4 MG Take 1 Tab by mouth. Resume unchanged        .                 Medicines prescribed today were sent to:     [facility] [id number] [place] [address], [place] - [address]    [address] [place] [address]    Phone: [phone] Fax: [phone]    Open 24 Hours?: No      Medication refill instructions:       If your prescription bottle indicates you have medication refills left, it is not necessary to call your provider’s office. Please contact your pharmacy and they will refill your medication.    If your prescription bottle indicates you do not have any refills left, you may request refills at any time through one of the following ways: The online AppNeta system (except Urgent Care), by calling your provider’s office, or by asking your pharmacy to contact your provider’s office with a refill request. Medication refills are processed only during regular business hours and may not be available until the next business day. Your provider may request additional information or to have a follow-up visit with you prior to refilling your medication.   *Please Note: Medication refills are assigned a new Rx number when refilled electronically. Your pharmacy may indicate that no refills were authorized even though a new prescription for the same medication is available at the pharmacy. Please request the medicine by name with the pharmacy before contacting your provider for a refill.        Other Notes About Your Plan     Baby Elidia Gonzalez Access Code: [email or web address]  Expires: 6/30/2017  3:26 PM    Your email address is not on file at [facility].  Email Addresses are required for you to sign up for AppNeta, please contact [phone] to verify your personal information and to provide your email address prior to attempting to register for AppNeta.    Jackelyn Morrow  [address] [facility] [place] [address]    AppNeta  A secure, online tool to manage your health information     [facility]’s AppNeta® is a secure, online tool that connects you to your personalized health information from  the privacy of your home -- day or night - making it very easy for you to manage your healthcare. Once the activation process is completed, you can even access your medical information using the ThousandEyes ihsan, which is available for free in the Apple Ihsan store or Google Play store.     To learn more about ThousandEyes, visit [email or web address]    There are two levels of access available (as shown below):   My Chart Features  [facility]  Urgent  Care Non-[facility] Doctor   Email your healthcare team securely and privately 24/7 X X X    Manage appointments: schedule your next appointment; view details of past/upcoming appointments X      Request prescription refills. X      View recent personal medical records, including lab and immunizations X X X X   View health record, including health history, allergies, medications X X X X   Read reports about your outpatient visits, procedures, consult and ER notes X X X X   See your discharge summary, which is a recap of your hospital and/or ER visit that includes your diagnosis, lab results, and care plan X X  X     How to register for ThousandEyes:  Once your e-mail address has been verified, follow the following steps to sign up for ThousandEyes.     1. Go to  [email or web address]  2. Click on the Sign Up Now box, which takes you to the New Member Sign Up page. You will need to provide the following information:  a. Enter your ThousandEyes Access Code exactly as it appears at the top of this page. (You will not need to use this code after you’ve completed the sign-up process. If you do not sign up before the expiration date, you must request a new code.)   b. Enter your date of birth.   c. Enter your home email address.   d. Click Submit, and follow the next screen’s instructions.  3. Create a IndexTankt ID. This will be your ThousandEyes login ID and cannot be changed, so think of one that is secure and easy to remember.  4. Create a Capital Teashart  password. You can change your password at any time.  5. Enter your Password Reset Question and Answer. This can be used at a later time if you forget your password.   6. Enter your e-mail address. This allows you to receive e-mail notifications when new information is available in Mithridion.  7. Click Sign Up. You can now view your health information.    For assistance activating your Mithridion account, call [phone]

## 2017-05-31 NOTE — PATIENT INSTRUCTIONS
P:  1.  GBS @ 35 wks.          2.  Continue FKCs.          3.  Questions answered.          4.  Encouraged pt to tour L&D.          5.  Encourage adequate water intake.        6.  F/u 1 wks.        7.  FYI: none.

## 2017-05-31 NOTE — PROGRESS NOTES
S:  Pt is  at 34w5d for routine OB follow up.  Reports UCs.  Reports good FM.  Denies VB, LOF, or vaginal DC.  Desires SVE.      O:  Please see above vitals.        FHTs: 158        Fundal ht: 34 cm.    A:  IUP at 34w5d  Patient Active Problem List    Diagnosis Date Noted   • Encounter for supervision of other normal pregnancy 2010        P:  1.  GBS @ 35 wks.          2.  Continue FKCs.          3.  Questions answered.          4.  Encouraged pt to tour L&D.          5.  Encourage adequate water intake.        6.  F/u 1 wks.        7.  FYI: none.

## 2017-06-07 ENCOUNTER — ROUTINE PRENATAL (OUTPATIENT)
Dept: OBGYN | Facility: CLINIC | Age: 37
End: 2017-06-07
Payer: MEDICAID

## 2017-06-07 VITALS — BODY MASS INDEX: 38.09 KG/M2 | SYSTOLIC BLOOD PRESSURE: 122 MMHG | DIASTOLIC BLOOD PRESSURE: 72 MMHG | WEIGHT: 215 LBS

## 2017-06-07 DIAGNOSIS — Z34.80 ENCOUNTER FOR SUPERVISION OF OTHER NORMAL PREGNANCY: ICD-10-CM

## 2017-06-07 PROCEDURE — 90040 PR PRENATAL FOLLOW UP: CPT | Performed by: NURSE PRACTITIONER

## 2017-06-07 NOTE — MR AVS SNAPSHOT
Jackelyn Morrow   2017 3:15 PM   Routine Prenatal   MRN: [id number]    [facility]   Dept Phone:  [phone]    Description:  Female : 1980   Provider:  YANNICK Ruiz           Allergies as of 2017     No Known Allergies      Vital Signs     Blood Pressure Weight Last Menstrual Period Smoking Status          122/72 mmHg 97.523 kg (215 lb) 2016 Former Smoker        Basic Information     Date Of Birth Sex Race Ethnicity Preferred Language    1980 Female White Unknown English      Your appointments     2017  4:00 PM   OB Follow Up with Christy Neal C.N.M.   [facility]    [address]  [place] [address] [phone]              Problem List              ICD-10-CM Priority Class Noted - Resolved    Encounter for supervision of other normal pregnancy Z34.80   2010 - Present      Health Maintenance        Date Due Completion Dates    PAP SMEAR 2020    IMM DTaP/Tdap/Td Vaccine (3 - Td) 2027, 2010            Current Immunizations     Tdap Vaccine 2017  2:51 PM, 2010  6:00 PM      Below and/or attached are the medications your provider expects you to take. Review all of your home medications and newly ordered medications with your provider and/or pharmacist. Follow medication instructions as directed by your provider and/or pharmacist. Please keep your medication list with you and share with your provider. Update the information when medications are discontinued, doses are changed, or new medications (including over-the-counter products) are added; and carry medication information at all times in the event of emergency situations     Allergies:  No Known Allergies          Medications  Valid as of: 2017 -  3:55 PM    Generic Name Brand Name Tablet Size Instructions for use    Prenatal Vit w/Ch-Hrdusryep-XC (Tab) PNV 27-0.6-0.4 MG Take 1 Tab by mouth.  Resume unchanged        .                 Medicines prescribed today were sent to:     [facility] [id number]  [place], [place] - [address]    [address] [place] [address]    Phone: [phone] Fax: [phone]    Open 24 Hours?: No      Medication refill instructions:       If your prescription bottle indicates you have medication refills left, it is not necessary to call your provider’s office. Please contact your pharmacy and they will refill your medication.    If your prescription bottle indicates you do not have any refills left, you may request refills at any time through one of the following ways: The online [facility] system (except Urgent Care), by calling your provider’s office, or by asking your pharmacy to contact your provider’s office with a refill request. Medication refills are processed only during regular business hours and may not be available until the next business day. Your provider may request additional information or to have a follow-up visit with you prior to refilling your medication.   *Please Note: Medication refills are assigned a new Rx number when refilled electronically. Your pharmacy may indicate that no refills were authorized even though a new prescription for the same medication is available at the pharmacy. Please request the medicine by name with the pharmacy before contacting your provider for a refill.        Other Notes About Your Plan     Baby Elidia           [facility] Access Code: [email or web address]  Expires: 6/30/2017  3:26 PM    Your email address is not on file at [facility].  Email Addresses are required for you to sign up for [facility], please contact [phone] to verify your personal information and to provide your email address prior to attempting to register for [facility].    Jackelyn Morrow  [address] [place] [address]    [facility]  A secure, online tool to manage your health information     [facility]’s [facility]® is  a secure, online tool that connects you to your personalized health information from the privacy of your home -- day or night - making it very easy for you to manage your healthcare. Once the activation process is completed, you can even access your medical information using the Eco Plastics ihsan, which is available for free in the Apple Ihsan store or Google Play store.     To learn more about Eco Plastics, visit [email or web address]    There are two levels of access available (as shown below):   My Chart Features  [facility]  Urgent  Care Non-[facility] Primary [facility] Doctor   Email your healthcare team securely and privately 24/7 X X X    Manage appointments: schedule your next appointment; view details of past/upcoming appointments X      Request prescription refills. X      View recent personal medical records, including lab and immunizations X X X X   View health record, including health history, allergies, medications X X X X   Read reports about your outpatient visits, procedures, consult and ER notes X X X X   See your discharge summary, which is a recap of your hospital and/or ER visit that includes your diagnosis, lab results, and care plan X X  X     How to register for Eco Plastics:  Once your e-mail address has been verified, follow the following steps to sign up for Eco Plastics.     1. Go to  [email or web address]-D Display [email or web address]  2. Click on the Sign Up Now box, which takes you to the New Member Sign Up page. You will need to provide the following information:  a. Enter your Eco Plastics Access Code exactly as it appears at the top of this page. (You will not need to use this code after you’ve completed the sign-up process. If you do not sign up before the expiration date, you must request a new code.)   b. Enter your date of birth.   c. Enter your home email address.   d. Click Submit, and follow the next screen’s instructions.  3. Create a Eco Plastics ID. This will be your Eco Plastics login ID and cannot  be changed, so think of one that is secure and easy to remember.  4. Create a Gecko Biomedical password. You can change your password at any time.  5. Enter your Password Reset Question and Answer. This can be used at a later time if you forget your password.   6. Enter your e-mail address. This allows you to receive e-mail notifications when new information is available in Gecko Biomedical.  7. Click Sign Up. You can now view your health information.    For assistance activating your Gecko Biomedical account, call [phone]

## 2017-06-14 ENCOUNTER — ROUTINE PRENATAL (OUTPATIENT)
Dept: OBGYN | Facility: CLINIC | Age: 37
End: 2017-06-14
Payer: MEDICAID

## 2017-06-14 VITALS — BODY MASS INDEX: 37.92 KG/M2 | DIASTOLIC BLOOD PRESSURE: 88 MMHG | WEIGHT: 214 LBS | SYSTOLIC BLOOD PRESSURE: 144 MMHG

## 2017-06-14 DIAGNOSIS — Z34.80 ENCOUNTER FOR SUPERVISION OF OTHER NORMAL PREGNANCY: Primary | ICD-10-CM

## 2017-06-14 PROCEDURE — 90040 PR PRENATAL FOLLOW UP: CPT | Performed by: NURSE PRACTITIONER

## 2017-06-14 NOTE — PROGRESS NOTES
S:  Pt is  at 36w5d here for routine OB follow up.  Reports UCs.  Reports good FM.  Denies VB, LOF, or vaginal DC.     O:  Please see above vitals.        FHTs: 150        Fundal ht: 36        Fetal position: vertex        SVE: 1-/-2        GBS neg on 17 -- reviewed w pt.      A:  IUP at 36w5d  Patient Active Problem List    Diagnosis Date Noted   • Encounter for supervision of other normal pregnancy 2010       P:  1.  Continue FKCs.         2.  Labor precautions given.  Instructions given on where to go.  Pt receptive to education.         3.  Reviewed GBS status w pt.       4.  Questions answered.         5.  Encouraged adequate water intake       6.  F/u 1wk       7.  FYI: none.

## 2017-06-14 NOTE — MR AVS SNAPSHOT
Jackelyn Morrow   2017 4:00 PM   Routine Prenatal   MRN: [id number]    Department[facility]  [facility]   Dept Phone:  [phone]    Description:  Female : 1980   Provider:  Christy Neal C.N.M.           Allergies as of 2017     No Known Allergies      You were diagnosed with     Encounter for supervision of other normal pregnancy   [8822815]  -  Primary       Vital Signs     Blood Pressure Weight Last Menstrual Period Smoking Status          144/88 mmHg 97.07 kg (214 lb) 2016 Former Smoker        Basic Information     Date Of Birth Sex Race Ethnicity Preferred Language    1980 Female White Unknown English      Problem List              ICD-10-CM Priority Class Noted - Resolved    Encounter for supervision of other normal pregnancy Z34.80   2010 - Present      Health Maintenance        Date Due Completion Dates    PAP SMEAR 2020    IMM DTaP/Tdap/Td Vaccine (3 - Td) 2027, 2010            Current Immunizations     Tdap Vaccine 2017  2:51 PM, 2010  6:00 PM      Below and/or attached are the medications your provider expects you to take. Review all of your home medications and newly ordered medications with your provider and/or pharmacist. Follow medication instructions as directed by your provider and/or pharmacist. Please keep your medication list with you and share with your provider. Update the information when medications are discontinued, doses are changed, or new medications (including over-the-counter products) are added; and carry medication information at all times in the event of emergency situations     Allergies:  No Known Allergies          Medications  Valid as of: 2017 -  4:12 PM    Generic Name Brand Name Tablet Size Instructions for use    Prenatal Vit w/Gy-Ctfmakvjy-LK (Tab) PNV 27-0.6-0.4 MG Take 1 Tab by mouth. Resume unchanged        .                 Medicines prescribed today were sent to:     [facility] [address]  [place], [place] - [address] [place] [address]    Phone: [phone] Fax: [phone]    Open 24 Hours?: No      Medication refill instructions:       If your prescription bottle indicates you have medication refills left, it is not necessary to call your provider’s office. Please contact your pharmacy and they will refill your medication.    If your prescription bottle indicates you do not have any refills left, you may request refills at any time through one of the following ways: The online Homestay.com system (except Urgent Care), by calling your provider’s office, or by asking your pharmacy to contact your provider’s office with a refill request. Medication refills are processed only during regular business hours and may not be available until the next business day. Your provider may request additional information or to have a follow-up visit with you prior to refilling your medication.   *Please Note: Medication refills are assigned a new Rx number when refilled electronically. Your pharmacy may indicate that no refills were authorized even though a new prescription for the same medication is available at the pharmacy. Please request the medicine by name with the pharmacy before contacting your provider for a refill.        Instructions    P: 1. Continue FKCs.   2. Labor precautions given. Instructions given on where to go. Pt receptive to education.   3. Reviewed GBS status w pt.   4. Questions answered.   5. Encouraged adequate water intake   6. F/u 1wk   7. FYI: none.          Other Notes About Your Plan     [facility] Access Code: [email or web address]  Expires: 6/30/2017  3:26 PM    Your email address is not on file at [facility].  Email Addresses are required for you to sign up for Homestay.com, please contact [phone] to verify your personal information and to provide your email address prior to attempting to  register for Gamblinot.    Jackelyn Morrow  [address] [place] [address]    Tissue Genesishart  A secure, online tool to manage your health information     [employer]’s Gamblinot® is a secure, online tool that connects you to your personalized health information from the privacy of your home -- day or night - making it very easy for you to manage your healthcare. Once the activation process is completed, you can even access your medical information using the Jinni ihsan, which is available for free in the Apple Ihsan store or Google Play store.     To learn more about Jinni, visit [email or web address]    There are two levels of access available (as shown below):   My Chart Features  [facility] Primary Care Doctor [facility]  Specialists [facility]  Urgent  Care Non-[facility] Primary Care Doctor   Email your healthcare team securely and privately 24/7 X X X    Manage appointments: schedule your next appointment; view details of past/upcoming appointments X      Request prescription refills. X      View recent personal medical records, including lab and immunizations X X X X   View health record, including health history, allergies, medications X X X X   Read reports about your outpatient visits, procedures, consult and ER notes X X X X   See your discharge summary, which is a recap of your hospital and/or ER visit that includes your diagnosis, lab results, and care plan X X  X     How to register for Gamblinot:  Once your e-mail address has been verified, follow the following steps to sign up for Gamblinot.     1. Go to  [email or web address]  2. Click on the Sign Up Now box, which takes you to the New Member Sign Up page. You will need to provide the following information:  a. Enter your Jinni Access Code exactly as it appears at the top of this page. (You will not need to use this code after you’ve completed the sign-up process. If you do not sign up before the expiration date, you must request a new code.)   b. Enter your date  of birth.   c. Enter your home email address.   d. Click Submit, and follow the next screen’s instructions.  3. Create a Wipebook ID. This will be your Wipebook login ID and cannot be changed, so think of one that is secure and easy to remember.  4. Create a Vocationt password. You can change your password at any time.  5. Enter your Password Reset Question and Answer. This can be used at a later time if you forget your password.   6. Enter your e-mail address. This allows you to receive e-mail notifications when new information is available in Wipebook.  7. Click Sign Up. You can now view your health information.    For assistance activating your Wipebook account, call [phone]

## 2017-06-14 NOTE — PATIENT INSTRUCTIONS
P: 1. Continue FKCs.   2. Labor precautions given. Instructions given on where to go. Pt receptive to education.   3. Reviewed GBS status w pt.   4. Questions answered.   5. Encouraged adequate water intake   6. F/u 1wk   7. FYI: none.

## 2017-06-14 NOTE — PROGRESS NOTES
Pt here today for OB follow up  Pt states having cx's that are about 10 mins apart.   Reports +FM   Good # [phone]  Pharmacy Confirmed.  GBS Negative

## 2017-06-22 ENCOUNTER — ROUTINE PRENATAL (OUTPATIENT)
Dept: OBGYN | Facility: CLINIC | Age: 37
End: 2017-06-22
Payer: MEDICAID

## 2017-06-22 VITALS — DIASTOLIC BLOOD PRESSURE: 68 MMHG | SYSTOLIC BLOOD PRESSURE: 122 MMHG | BODY MASS INDEX: 38.27 KG/M2 | WEIGHT: 216 LBS

## 2017-06-22 DIAGNOSIS — O09.523 MULTIGRAVIDA OF ADVANCED MATERNAL AGE, THIRD TRIMESTER: ICD-10-CM

## 2017-06-22 PROBLEM — O09.529 MULTIGRAVIDA OF ADVANCED MATERNAL AGE: Status: ACTIVE | Noted: 2017-06-22

## 2017-06-22 PROCEDURE — 90040 PR PRENATAL FOLLOW UP: CPT | Performed by: PHYSICIAN ASSISTANT

## 2017-06-22 NOTE — MR AVS SNAPSHOT
Jackelyn Morrow   2017 4:00 PM   Routine Prenatal   MRN: [id number]    Department[facility]  [facility]   Dept Phone:  [phone]    Description:  Female : 1980   Provider:  MICHELLE Kimbrough           Allergies as of 2017     No Known Allergies      You were diagnosed with     Multigravida of advanced maternal age, third trimester   [0771156]         Vital Signs     Blood Pressure Weight Last Menstrual Period Smoking Status          122/68 mmHg 97.977 kg (216 lb) 2016 Former Smoker        Basic Information     Date Of Birth Sex Race Ethnicity Preferred Language    1980 Female White Unknown English      Problem List              ICD-10-CM Priority Class Noted - Resolved    Encounter for supervision of other normal pregnancy Z34.80   2010 - Present    Multigravida of advanced maternal age O09.529   2017 - Present      Health Maintenance        Date Due Completion Dates    PAP SMEAR 2020    IMM DTaP/Tdap/Td Vaccine (3 - Td) 2027, 2010            Current Immunizations     Tdap Vaccine 2017  2:51 PM, 2010  6:00 PM      Below and/or attached are the medications your provider expects you to take. Review all of your home medications and newly ordered medications with your provider and/or pharmacist. Follow medication instructions as directed by your provider and/or pharmacist. Please keep your medication list with you and share with your provider. Update the information when medications are discontinued, doses are changed, or new medications (including over-the-counter products) are added; and carry medication information at all times in the event of emergency situations     Allergies:  No Known Allergies          Medications  Valid as of: 2017 -  4:16 PM    Generic Name Brand Name Tablet Size Instructions for use    Prenatal Vit w/Nb-Wyqjxobkm-KY (Tab) PNV 27-0.6-0.4 MG Take 1 Tab by mouth. Resume unchanged        .                  Medicines prescribed today were sent to:     [id number]  [place], [place] - [address] [facility] [address]    [address] [place] [address]    Phone: [phone] Fax: [phone]    Open 24 Hours?: No      Medication refill instructions:       If your prescription bottle indicates you have medication refills left, it is not necessary to call your provider’s office. Please contact your pharmacy and they will refill your medication.    If your prescription bottle indicates you do not have any refills left, you may request refills at any time through one of the following ways: The online PollGround system (except Urgent Care), by calling your provider’s office, or by asking your pharmacy to contact your provider’s office with a refill request. Medication refills are processed only during regular business hours and may not be available until the next business day. Your provider may request additional information or to have a follow-up visit with you prior to refilling your medication.   *Please Note: Medication refills are assigned a new Rx number when refilled electronically. Your pharmacy may indicate that no refills were authorized even though a new prescription for the same medication is available at the pharmacy. Please request the medicine by name with the pharmacy before contacting your provider for a refill.        Other Notes About Your Plan     Baby Elidia           PollGround Access Code: [email or web address]  Expires: 6/30/2017  3:26 PM    Your email address is not on file at [facility].  Email Addresses are required for you to sign up for PollGround, please contact [phone] to verify your personal information and to provide your email address prior to attempting to register for PollGround.    Jackelyn Morrow  [address] [place] [address]    PollGround  A secure, online tool to manage your health information     [facility]’s PollGround® is a secure, online tool that  connects you to your personalized health information from the privacy of your home -- day or night - making it very easy for you to manage your healthcare. Once the activation process is completed, you can even access your medical information using the Vidaao ihsan, which is available for free in the Apple Ihsan store or Google Play store.     To learn more about Vidaao, visit [email or web address]    There are two levels of access available (as shown below):   My Chart Features  [facility] Doctor [facility]  Specialists [facility]  Urgent  Care Non-[facility] Primary Care Doctor   Email your healthcare team securely and privately 24/7 X X X    Manage appointments: schedule your next appointment; view details of past/upcoming appointments X      Request prescription refills. X      View recent personal medical records, including lab and immunizations X X X X   View health record, including health history, allergies, medications X X X X   Read reports about your outpatient visits, procedures, consult and ER notes X X X X   See your discharge summary, which is a recap of your hospital and/or ER visit that includes your diagnosis, lab results, and care plan X X  X     How to register for Vidaao:  Once your e-mail address has been verified, follow the following steps to sign up for Vidaao.     1. Go to  [email or web address]  2. Click on the Sign Up Now box, which takes you to the New Member Sign Up page. You will need to provide the following information:  a. Enter your Vidaao Access Code exactly as it appears at the top of this page. (You will not need to use this code after you’ve completed the sign-up process. If you do not sign up before the expiration date, you must request a new code.)   b. Enter your date of birth.   c. Enter your home email address.   d. Click Submit, and follow the next screen’s instructions.  3. Create a Vidaao ID. This will be your Vidaao login ID and cannot be changed, so think of one  that is secure and easy to remember.  4. Create a Selligy password. You can change your password at any time.  5. Enter your Password Reset Question and Answer. This can be used at a later time if you forget your password.   6. Enter your e-mail address. This allows you to receive e-mail notifications when new information is available in Selligy.  7. Click Sign Up. You can now view your health information.    For assistance activating your Selligy account, call [phone]

## 2017-06-22 NOTE — PROGRESS NOTES
OB f/u today  + FM; No VB, LOF   Good phone # [phone]  Preferred pharmacy confirmed  Pt reports some UC

## 2017-06-22 NOTE — PROGRESS NOTES
Pt has no complaints with cramping, UCs, VB, LOF, though pt has had occ UCs over past week. +FM. Labor precautions reviewed. Daily FKC and walks recommended. Pt wants to be checked - cervix: Cl/th/-2, post, med, vtx . RTC 1 wk or sooner prn.

## 2017-06-30 ENCOUNTER — ROUTINE PRENATAL (OUTPATIENT)
Dept: OBGYN | Facility: CLINIC | Age: 37
End: 2017-06-30
Payer: MEDICAID

## 2017-06-30 VITALS — WEIGHT: 218 LBS | BODY MASS INDEX: 38.63 KG/M2 | SYSTOLIC BLOOD PRESSURE: 126 MMHG | DIASTOLIC BLOOD PRESSURE: 74 MMHG

## 2017-06-30 DIAGNOSIS — O09.523 MULTIGRAVIDA OF ADVANCED MATERNAL AGE, THIRD TRIMESTER: ICD-10-CM

## 2017-06-30 PROCEDURE — 90040 PR PRENATAL FOLLOW UP: CPT | Performed by: PHYSICIAN ASSISTANT

## 2017-06-30 NOTE — PROGRESS NOTES
Pt here today for OB follow up  Pt states having cx's 5-10 mins apart last night, but stopped.   Reports +  Good # [phone]  Pharmacy Confirmed.

## 2017-06-30 NOTE — MR AVS SNAPSHOT
Jackelyn Morrow   2017 8:15 AM   Routine Prenatal   MRN: [id number]    Department[facility]  [facility]   Dept Phone:  [phone]    Description:  Female : 1980   Provider:  MICHELLE Kimbrough           Allergies as of 2017     No Known Allergies      You were diagnosed with     Multigravida of advanced maternal age, third trimester   [[id number]]         Vital Signs     Blood Pressure Weight Last Menstrual Period Smoking Status          126/74 mmHg 98.884 kg (218 lb) 2016 Former Smoker        Basic Information     Date Of Birth Sex Race Ethnicity Preferred Language    1980 Female White Unknown English      Problem List              ICD-10-CM Priority Class Noted - Resolved    Encounter for supervision of other normal pregnancy Z34.80   2010 - Present    Multigravida of advanced maternal age O09.529   2017 - Present      Health Maintenance        Date Due Completion Dates    PAP SMEAR 2020    IMM DTaP/Tdap/Td Vaccine (3 - Td) 2027, 2010            Current Immunizations     Tdap Vaccine 2017  2:51 PM, 2010  6:00 PM      Below and/or attached are the medications your provider expects you to take. Review all of your home medications and newly ordered medications with your provider and/or pharmacist. Follow medication instructions as directed by your provider and/or pharmacist. Please keep your medication list with you and share with your provider. Update the information when medications are discontinued, doses are changed, or new medications (including over-the-counter products) are added; and carry medication information at all times in the event of emergency situations     Allergies:  No Known Allergies          Medications  Valid as of: 2017 -  8:26 AM    Generic Name Brand Name Tablet Size Instructions for use    Prenatal Vit w/Zz-Pxkvoettm-XO (Tab) PNV 27-0.6-0.4 MG Take 1 Tab by mouth. Resume unchanged        .                  Medicines prescribed today were sent to:     [facility] - [place], [place] - [address] AT [facility] [address] [place] [address]    Phone: [phone] Fax: [phone]    Open 24 Hours?: No      Medication refill instructions:       If your prescription bottle indicates you have medication refills left, it is not necessary to call your provider’s office. Please contact your pharmacy and they will refill your medication.    If your prescription bottle indicates you do not have any refills left, you may request refills at any time through one of the following ways: The online [employer] system (except Urgent Care), by calling your provider’s office, or by asking your pharmacy to contact your provider’s office with a refill request. Medication refills are processed only during regular business hours and may not be available until the next business day. Your provider may request additional information or to have a follow-up visit with you prior to refilling your medication.   *Please Note: Medication refills are assigned a new Rx number when refilled electronically. Your pharmacy may indicate that no refills were authorized even though a new prescription for the same medication is available at the pharmacy. Please request the medicine by name with the pharmacy before contacting your provider for a refill.        Other Notes About Your Plan     Baby Elidia           [email or web address] [email or web address] Status: Patient Declined

## 2017-06-30 NOTE — PROGRESS NOTES
Pt has no complaints with cramping, regular UCs, Vb, LOF. +FM. IOL referral sent today for 41wk. Labor precautions reviewed. Daily FKC recommended. Cervix: Ft/50/-3, post, med, vtx. RTC 1 wk or sooner prn.

## 2017-07-07 ENCOUNTER — ROUTINE PRENATAL (OUTPATIENT)
Dept: OBGYN | Facility: CLINIC | Age: 37
End: 2017-07-07
Payer: MEDICAID

## 2017-07-07 VITALS — DIASTOLIC BLOOD PRESSURE: 76 MMHG | SYSTOLIC BLOOD PRESSURE: 120 MMHG | BODY MASS INDEX: 39.16 KG/M2 | WEIGHT: 221 LBS

## 2017-07-07 DIAGNOSIS — O09.523 MULTIGRAVIDA OF ADVANCED MATERNAL AGE, THIRD TRIMESTER: ICD-10-CM

## 2017-07-07 PROCEDURE — 90040 PR PRENATAL FOLLOW UP: CPT | Performed by: NURSE PRACTITIONER

## 2017-07-07 NOTE — PROGRESS NOTES
Ob f/u. + fetal movement   No VB, LOF or contractions   C/O back pain, contractions   Phone number [phone]  Pharmacy verified with patient  WT=  221lbs           DR=046/76  Patient is scheduled for GEL on 07/12/17 at 8pm and IOL on 07/13/17 at 8am. Pt notified today

## 2017-07-07 NOTE — MR AVS SNAPSHOT
Jackelyn Morrow   2017 9:00 AM   Routine Prenatal   MRN: [id number]    Department[facility]  [facility]   Dept Phone:  [phone]    Description:  Female : 1980   Provider:  Gini Durbin D.N.P.           Allergies as of 2017     No Known Allergies      You were diagnosed with     Multigravida of advanced maternal age, third trimester   [[id number]]         Vital Signs     Blood Pressure Weight Last Menstrual Period Smoking Status          120/76 mmHg 100.245 kg (221 lb) 2016 Former Smoker        Basic Information     Date Of Birth Sex Race Ethnicity Preferred Language    1980 Female White Unknown English      Your appointments     2017  8:00 PM   [facility] OP GEL IOL with NON-SURGICAL L&D   [facility] (--)    [address] [facility] [address]   [phone]            2017  8:00 AM   [facility] INDUCTION OF LABOR with NON-SURGICAL L&D   [facility] DEL[facility] (--)    [address] [facility] [address]   [phone]              Problem List              ICD-10-CM Priority Class Noted - Resolved    Encounter for supervision of other normal pregnancy Z34.80   2010 - Present    Multigravida of advanced maternal age O09.529   2017 - Present      Health Maintenance        Date Due Completion Dates    IMM INFLUENZA (1) 2017 ---    PAP SMEAR 2020    IMM DTaP/Tdap/Td Vaccine (3 - Td) 2027, 2010            Current Immunizations     Tdap Vaccine 2017  2:51 PM, 2010  6:00 PM      Below and/or attached are the medications your provider expects you to take. Review all of your home medications and newly ordered medications with your provider and/or pharmacist. Follow medication instructions as directed by your provider and/or pharmacist. Please keep your medication list with you and share with your provider. Update the information when medications are discontinued, doses are changed, or new medications (including  over-the-counter products) are added; and carry medication information at all times in the event of emergency situations     Allergies:  No Known Allergies          Medications  Valid as of: July 07, 2017 -  9:04 AM    Generic Name Brand Name Tablet Size Instructions for use    Prenatal Vit w/Ns-Hkidjqhlf-LY (Tab) PNV 27-0.6-0.4 MG Take 1 Tab by mouth. Resume unchanged        .                 Medicines prescribed today were sent to:     [facility] [address] [place] [address] [place], [place] - [address] AT [facility] [address]    [address] [place] [address]    Phone: [phone] Fax: [phone]    Open 24 Hours?: No      Medication refill instructions:       If your prescription bottle indicates you have medication refills left, it is not necessary to call your provider’s office. Please contact your pharmacy and they will refill your medication.    If your prescription bottle indicates you do not have any refills left, you may request refills at any time through one of the following ways: The online [employer] system (except Urgent Care), by calling your provider’s office, or by asking your pharmacy to contact your provider’s office with a refill request. Medication refills are processed only during regular business hours and may not be available until the next business day. Your provider may request additional information or to have a follow-up visit with you prior to refilling your medication.   *Please Note: Medication refills are assigned a new Rx number when refilled electronically. Your pharmacy may indicate that no refills were authorized even though a new prescription for the same medication is available at the pharmacy. Please request the medicine by name with the pharmacy before contacting your provider for a refill.        Other Notes About Your Plan     Normal pnp, normal glucose, neg GBS. Normal US Baby Elidia           MyChart Status: Patient Declined

## 2017-07-07 NOTE — PROGRESS NOTES
S) Pt is a 37 y.o.   at 40w0d  gestation. Routine prenatal care today. States irregular UC's and lower back discomfort. No other reported issues.  Reports good  fetal movement. Denies cramping, bleeding or leaking of fluid. Denies dysuria. Generally feels well today. Good self-care activities identified. Denies headaches.     O) see flow         Filed Vitals:    17 0852   BP: 120/76   Weight: 100.245 kg (221 lb)           Lab: normal prenatal panel, normal glucose. Neg GBS       Pertinent ultrasound - Normal fetal survey            A) IUP at 40w0d       S=D         Patient Active Problem List    Diagnosis Date Noted   • Multigravida of advanced maternal age 2017   • Encounter for supervision of other normal pregnancy 2010       P) s/s labor vs general discomforts. Fetal movements reviewed. General ed and anticipatory guidance. Nutrition/exercise/vitamin. Continue PNV. Understands induction of labor instructions.   Plans breast or bottle - breast   Plans pp contraception - BTL.   STD protection - no new exposure.   Smoking/etoh/drugs - no exposure.    Flu shot recommended - none in clinic

## 2017-07-12 ENCOUNTER — HOSPITAL ENCOUNTER (INPATIENT)
Facility: MEDICAL CENTER | Age: 37
LOS: 3 days | End: 2017-07-16
Attending: OBSTETRICS & GYNECOLOGY | Admitting: OBSTETRICS & GYNECOLOGY
Payer: MEDICAID

## 2017-07-12 LAB
ALBUMIN SERPL BCP-MCNC: 3.1 G/DL (ref 3.2–4.9)
ALBUMIN/GLOB SERPL: 1 G/DL
ALP SERPL-CCNC: 118 U/L (ref 30–99)
ALT SERPL-CCNC: 7 U/L (ref 2–50)
AMPHET UR QL SCN: NEGATIVE
ANION GAP SERPL CALC-SCNC: 9 MMOL/L (ref 0–11.9)
APPEARANCE UR: ABNORMAL
AST SERPL-CCNC: 14 U/L (ref 12–45)
BARBITURATES UR QL SCN: NEGATIVE
BASOPHILS # BLD AUTO: 0.4 % (ref 0–1.8)
BASOPHILS # BLD: 0.04 K/UL (ref 0–0.12)
BENZODIAZ UR QL SCN: NEGATIVE
BILIRUB SERPL-MCNC: 0.3 MG/DL (ref 0.1–1.5)
BUN SERPL-MCNC: 9 MG/DL (ref 8–22)
BZE UR QL SCN: NEGATIVE
CALCIUM SERPL-MCNC: 9.5 MG/DL (ref 8.5–10.5)
CANNABINOIDS UR QL SCN: NEGATIVE
CHLORIDE SERPL-SCNC: 107 MMOL/L (ref 96–112)
CO2 SERPL-SCNC: 17 MMOL/L (ref 20–33)
COLOR UR AUTO: ABNORMAL
CREAT SERPL-MCNC: 0.5 MG/DL (ref 0.5–1.4)
EOSINOPHIL # BLD AUTO: 0.22 K/UL (ref 0–0.51)
EOSINOPHIL NFR BLD: 2 % (ref 0–6.9)
ERYTHROCYTE [DISTWIDTH] IN BLOOD BY AUTOMATED COUNT: 50.4 FL (ref 35.9–50)
GFR SERPL CREATININE-BSD FRML MDRD: >60 ML/MIN/1.73 M 2
GLOBULIN SER CALC-MCNC: 3.2 G/DL (ref 1.9–3.5)
GLUCOSE SERPL-MCNC: 83 MG/DL (ref 65–99)
GLUCOSE UR QL STRIP.AUTO: NEGATIVE MG/DL
HCT VFR BLD AUTO: 35.6 % (ref 37–47)
HGB BLD-MCNC: 11.6 G/DL (ref 12–16)
HOLDING TUBE BB 8507: NORMAL
IMM GRANULOCYTES # BLD AUTO: 0.1 K/UL (ref 0–0.11)
IMM GRANULOCYTES NFR BLD AUTO: 0.9 % (ref 0–0.9)
KETONES UR QL STRIP.AUTO: NEGATIVE MG/DL
LEUKOCYTE ESTERASE UR QL STRIP.AUTO: ABNORMAL
LYMPHOCYTES # BLD AUTO: 1.7 K/UL (ref 1–4.8)
LYMPHOCYTES NFR BLD: 15.4 % (ref 22–41)
MCH RBC QN AUTO: 31.6 PG (ref 27–33)
MCHC RBC AUTO-ENTMCNC: 32.6 G/DL (ref 33.6–35)
MCV RBC AUTO: 97 FL (ref 81.4–97.8)
METHADONE UR QL SCN: NEGATIVE
MONOCYTES # BLD AUTO: 1.22 K/UL (ref 0–0.85)
MONOCYTES NFR BLD AUTO: 11 % (ref 0–13.4)
NEUTROPHILS # BLD AUTO: 7.77 K/UL (ref 2–7.15)
NEUTROPHILS NFR BLD: 70.3 % (ref 44–72)
NITRITE UR QL STRIP.AUTO: NEGATIVE
NRBC # BLD AUTO: 0 K/UL
NRBC BLD AUTO-RTO: 0 /100 WBC
OPIATES UR QL SCN: NEGATIVE
OXYCODONE UR QL SCN: NEGATIVE
PCP UR QL SCN: NEGATIVE
PH UR STRIP.AUTO: 5.5 [PH]
PLATELET # BLD AUTO: 126 K/UL (ref 164–446)
PMV BLD AUTO: 10.7 FL (ref 9–12.9)
POTASSIUM SERPL-SCNC: 3.8 MMOL/L (ref 3.6–5.5)
PROPOXYPH UR QL SCN: NEGATIVE
PROT SERPL-MCNC: 6.3 G/DL (ref 6–8.2)
PROT UR QL STRIP: ABNORMAL MG/DL
RBC # BLD AUTO: 3.67 M/UL (ref 4.2–5.4)
RBC UR QL AUTO: ABNORMAL
SODIUM SERPL-SCNC: 133 MMOL/L (ref 135–145)
SP GR UR: >=1.03
URATE SERPL-MCNC: 4 MG/DL (ref 1.9–8.2)
WBC # BLD AUTO: 11.1 K/UL (ref 4.8–10.8)

## 2017-07-12 PROCEDURE — 700105 HCHG RX REV CODE 258: Performed by: NURSE PRACTITIONER

## 2017-07-12 PROCEDURE — 80307 DRUG TEST PRSMV CHEM ANLYZR: CPT

## 2017-07-12 PROCEDURE — 81002 URINALYSIS NONAUTO W/O SCOPE: CPT

## 2017-07-12 PROCEDURE — 84550 ASSAY OF BLOOD/URIC ACID: CPT

## 2017-07-12 PROCEDURE — 36415 COLL VENOUS BLD VENIPUNCTURE: CPT

## 2017-07-12 PROCEDURE — 700111 HCHG RX REV CODE 636 W/ 250 OVERRIDE (IP): Performed by: NURSE PRACTITIONER

## 2017-07-12 PROCEDURE — 80053 COMPREHEN METABOLIC PANEL: CPT

## 2017-07-12 PROCEDURE — 700101 HCHG RX REV CODE 250: Performed by: OBSTETRICS & GYNECOLOGY

## 2017-07-12 PROCEDURE — 85025 COMPLETE CBC W/AUTO DIFF WBC: CPT

## 2017-07-12 RX ORDER — ALUMINA, MAGNESIA, AND SIMETHICONE 2400; 2400; 240 MG/30ML; MG/30ML; MG/30ML
30 SUSPENSION ORAL EVERY 6 HOURS PRN
Status: DISCONTINUED | OUTPATIENT
Start: 2017-07-12 | End: 2017-07-13 | Stop reason: HOSPADM

## 2017-07-12 RX ORDER — LABETALOL HYDROCHLORIDE 5 MG/ML
20-40 INJECTION, SOLUTION INTRAVENOUS PRN
Status: DISCONTINUED | OUTPATIENT
Start: 2017-07-12 | End: 2017-07-16 | Stop reason: HOSPADM

## 2017-07-12 RX ORDER — SODIUM CHLORIDE, SODIUM LACTATE, POTASSIUM CHLORIDE, CALCIUM CHLORIDE 600; 310; 30; 20 MG/100ML; MG/100ML; MG/100ML; MG/100ML
INJECTION, SOLUTION INTRAVENOUS CONTINUOUS
Status: DISPENSED | OUTPATIENT
Start: 2017-07-12 | End: 2017-07-13

## 2017-07-12 RX ORDER — HYDRALAZINE HYDROCHLORIDE 20 MG/ML
5-10 INJECTION INTRAMUSCULAR; INTRAVENOUS PRN
Status: DISCONTINUED | OUTPATIENT
Start: 2017-07-12 | End: 2017-07-16 | Stop reason: HOSPADM

## 2017-07-12 RX ORDER — MISOPROSTOL 200 UG/1
800 TABLET ORAL
Status: DISCONTINUED | OUTPATIENT
Start: 2017-07-12 | End: 2017-07-13 | Stop reason: HOSPADM

## 2017-07-12 RX ORDER — DEXTROSE, SODIUM CHLORIDE, SODIUM LACTATE, POTASSIUM CHLORIDE, AND CALCIUM CHLORIDE 5; .6; .31; .03; .02 G/100ML; G/100ML; G/100ML; G/100ML; G/100ML
INJECTION, SOLUTION INTRAVENOUS CONTINUOUS
Status: DISCONTINUED | OUTPATIENT
Start: 2017-07-13 | End: 2017-07-13 | Stop reason: HOSPADM

## 2017-07-12 RX ORDER — ONDANSETRON 2 MG/ML
4 INJECTION INTRAMUSCULAR; INTRAVENOUS EVERY 6 HOURS PRN
Status: DISCONTINUED | OUTPATIENT
Start: 2017-07-12 | End: 2017-07-13

## 2017-07-12 RX ORDER — CARBOPROST TROMETHAMINE 250 UG/ML
250 INJECTION, SOLUTION INTRAMUSCULAR
Status: DISCONTINUED | OUTPATIENT
Start: 2017-07-12 | End: 2017-07-13 | Stop reason: HOSPADM

## 2017-07-12 RX ORDER — ONDANSETRON 4 MG/1
4 TABLET, ORALLY DISINTEGRATING ORAL EVERY 6 HOURS PRN
Status: DISCONTINUED | OUTPATIENT
Start: 2017-07-12 | End: 2017-07-13

## 2017-07-12 RX ADMIN — HYDRALAZINE HYDROCHLORIDE 5 MG: 20 INJECTION INTRAMUSCULAR; INTRAVENOUS at 21:37

## 2017-07-12 RX ADMIN — SODIUM CHLORIDE, POTASSIUM CHLORIDE, SODIUM LACTATE AND CALCIUM CHLORIDE: 600; 310; 30; 20 INJECTION, SOLUTION INTRAVENOUS at 21:38

## 2017-07-12 RX ADMIN — DINOPROSTONE 5 MG: 20 SUPPOSITORY VAGINAL at 21:46

## 2017-07-12 ASSESSMENT — LIFESTYLE VARIABLES
DO YOU DRINK ALCOHOL: NO
EVER_SMOKED: YES
ALCOHOL_USE: NO

## 2017-07-12 ASSESSMENT — PAIN SCALES - GENERAL: PAINLEVEL_OUTOF10: 0

## 2017-07-12 NOTE — IP AVS SNAPSHOT
Edtrips Access Code: Activation code not generated  Current Edtrips Status: Patient Declined    Your email address is not on file at [facility].  Email Addresses are required for you to sign up for Edtrips, please contact [phone] to verify your personal information and to provide your email address prior to attempting to register for Edtrips.    Jackelyn Morrow  [address] [place] [address]    Edtrips  A secure, online tool to manage your health information     [facility]’s Edtrips® is a secure, online tool that connects you to your personalized health information from the privacy of your home -- day or night - making it very easy for you to manage your healthcare. Once the activation process is completed, you can even access your medical information using the Edtrips ihsan, which is available for free in the Apple Ihsan store or Google Play store.     To learn more about Edtrips, visit [email or web address]    There are two levels of access available (as shown below):   My Chart Features  [facility] [place]  Specialists [facility]  Urgent  Care Non-[facility] Primary Care Doctor   Email your healthcare team securely and privately 24/7 X X X    Manage appointments: schedule your next appointment; view details of past/upcoming appointments X      Request prescription refills. X      View recent personal medical records, including lab and immunizations X X X X   View health record, including health history, allergies, medications X X X X   Read reports about your outpatient visits, procedures, consult and ER notes X X X X   See your discharge summary, which is a recap of your hospital and/or ER visit that includes your diagnosis, lab results, and care plan X X  X     How to register for [employer]ING[employer]:  Once your e-mail address has been verified, follow the following steps to sign up for L[employer]ING[employer].     1. Go to  [email or web address]  2. Click on the Sign Up Now box, which takes you to the New  Member Sign Up page. You will need to provide the following information:  a. Enter your Virtuata Access Code exactly as it appears at the top of this page. (You will not need to use this code after you’ve completed the sign-up process. If you do not sign up before the expiration date, you must request a new code.)   b. Enter your date of birth.   c. Enter your home email address.   d. Click Submit, and follow the next screen’s instructions.  3. Create a Naowt ID. This will be your Virtuata login ID and cannot be changed, so think of one that is secure and easy to remember.  4. Create a Virtuata password. You can change your password at any time.  5. Enter your Password Reset Question and Answer. This can be used at a later time if you forget your password.   6. Enter your e-mail address. This allows you to receive e-mail notifications when new information is available in Virtuata.  7. Click Sign Up. You can now view your health information.    For assistance activating your Virtuata account, call [phone]

## 2017-07-12 NOTE — IP AVS SNAPSHOT
Home Care Instructions                                                                                                                Jackelyn Morrow   MRN: [id number]    Department:  POST PARTUM [address]   2017           Your appointments     2017 11:00 AM   Post Partum  Check with BUDDY LEDEZMA   [facility]    [address]  [place] [address]   [phone]            Aug 16, 2017 10:00 AM   Post Partum with MICHELLE Kimbrough   [facility]    [address]  [place] [facility] [address]   [phone]               I assume responsibility for securing a follow-up  Screening blood test on my baby within the specified date range.    -                  Discharge Instructions       POSTPARTUM DISCHARGE INSTRUCTIONS FOR MOM    YOB: 1980   Age: 37 y.o.               Admit Date: 2017     Discharge Date: 2017  Attending Doctor:  Alexsandra Villalobos M.D.                  Allergies:  Review of patient's allergies indicates no known allergies.    Discharged to home by car. Discharged via wheelchair, hospital escort: Yes.  Special equipment needed: Not Applicable  Belongings with: Personal  Be sure to schedule a follow-up appointment with your primary care doctor or any specialists as instructed.     Discharge Plan:   Diet Plan: Discussed  Activity Level: Discussed  Confirmed Follow up Appointment: Patient to Call and Schedule Appointment  Confirmed Symptoms Management: Discussed  Medication Reconciliation Updated: Yes  Influenza Vaccine Indication: Indicated: Not available from distributor/    REASONS TO CALL YOUR OBSTETRICIAN:  1.   Persistent fever or shaking chills (Temperature higher than 100.4)  2.   Heavy bleeding (soaking more than 1 pad per hour); Passing clots  3.   Foul odor from vagina  4.   Mastitis (Breast infection; breast pain, chills, fever, redness)  5.   Urinary pain, burning or frequency  6.   Episiotomy  "infection  7.   Abdominal incision infection  8.   Severe depression longer than 24 hours    HAND WASHING  · Prior to handling the baby.  · Before breastfeeding or bottle feeding baby.  · After using the bathroom or changing the baby's diaper.    WOUND CARE  Ask your physician for additional care instructions.  In general:    ·  Incision:      · Keep clean and dry.    · Do NOT lift anything heavier than your baby for up to 6 weeks.    · There should not be any opening or pus.      VAGINAL CARE  · Nothing inside vagina for 6 weeks: no sexual intercourse, tampons or douching.  · Bleeding may continue for 2-4 weeks.  Amount may vary.    · Call your physician for heavy bleeding which means soaking more than 1 pad per hour    BIRTH CONTROL  · It is possible to become pregnant at any time after delivery and while breastfeeding.  · Plan to discuss a method of birth control with your physician at your follow up visit. visit.    DIET AND ELIMINATION  · Eating more fiber (bran cereal, fruits, and vegetables) and drinking plenty of fluids will help to avoid constipation.  · Urinary frequency after childbirth is normal.    POSTPARTUM BLUES  During the first few days after birth, you may experience a sense of the \"blues\" which may include impatience, irritability or even crying.  These feeling come and go quickly.  However, as many as 1 in 10 women experience emotional symptoms known as postpartum depression.    Postpartum depression:  May start as early as the second or third day after delivery or take several weeks or months to develop.  Symptoms of \"blues\" are present, but are more intense:  Crying spells; loss of appetite; feelings of hopelessness or loss of control; fear of touching the baby; over concern or no concern at all about the baby; little or no concern about your own appearance/caring for yourself; and/or inability to sleep or excessive sleeping.  Contact your physician if you are experiencing any of " "these symptoms.    Crisis Hotline:  · [place] Crisis Hotline:  5-328-NSJRVTA  Or 1-261.132.2079  · [place] Crisis Hotline:  1-123.190.9093  Or 546-078-1711    PREVENTING SHAKEN BABY:  If you are angry or stressed, PUT THE BABY IN THE CRIB, step away, take some deep breaths, and wait until you are calm to care for the baby.  DO NOT SHAKE THE BABY.  You are not alone, call a supporter for help.    · Crisis Call Center 24/7 crisis line 018-608-7139 or 1-276.769.1131  · You can also text them, text \"ANSWER\" to 020760    QUIT SMOKING/TOBACCO USE:  I understand the use of any tobacco products increases my chance of suffering from future heart disease and could cause other illnesses which may shorten my life. Quitting the use of tobacco products is the single most important thing I can do to improve my health. For further information on smoking / tobacco cessation call a Toll Free Quit Line at 1-776.870.4139 (*National Cancer [place]) or 1-980.683.3148 (American Lung Association) or you can access the web based program at www.lungusa.org.    · [place] Tobacco Users Help Line:  (133) 834-6466       Toll Free: 1-736.888.5090  · Quit Tobacco Program [facility] (839)655-7317    DEPRESSION / SUICIDE RISK:  As [facility] are [facility], it is important to learn how to keep safe from harming yourself.    Recognize the warning signs:  · Abrupt changes in personality, positive or negative- including increase in energy   · Giving away possessions  · Change in eating patterns- significant weight changes-  positive or negative  · Change in sleeping patterns- unable to sleep or sleeping all the time   · Unwillingness or inability to communicate  · Depression  · Unusual sadness, discouragement and loneliness  · Talk of wanting to die  · Neglect of personal appearance   · Rebelliousness- reckless behavior  · Withdrawal from people/activities they love  · Confusion- inability to " concentrate     If you or a loved one observes any of these behaviors or has concerns about self-harm, here's what you can do:  · Talk about it- your feelings and reasons for harming yourself  · Remove any means that you might use to hurt yourself (examples: pills, rope, extension cords, firearm)  · Get professional help from the community (Mental Health, Substance Abuse, psychological counseling)  · Do not be alone:Call your Safe Contact- someone whom you trust who will be there for you.  · Call your local CRISIS HOTLINE 017-6929 or 387-418-6031  · Call your local Children's Mobile Crisis Response Team [place] (050) 493-0533 or www.Matchfund  · Call the toll free National Suicide Prevention Hotlines   · National Suicide Prevention Lifeline 236-192-LMSP (2500)  · [place] Hope Line Network 800-SUICIDE (361-6033)    DISCHARGE SURVEY:  Thank you for choosing [facility].  We hope we provided you with very good care.  You may be receiving a survey in the mail.  Please fill it out.  Your opinion is valuable to us.    ADDITIONAL EDUCATIONAL MATERIALS GIVEN TO PATIENT:        My signature on this form indicates that:  1.  I have reviewed and understand the above information  2.  My questions regarding this information have been answered to my satisfaction.  3.  I have formulated a plan with my discharge nurse to obtain my prescribed medication for home.         Discharge Medication Instructions:    Below are the medications your physician expects you to take upon discharge:    Review all your home medications and newly ordered medications with your doctor and/or pharmacist. Follow medication instructions as directed by your doctor and/or pharmacist.    Please keep your medication list with you and share with your physician.               Medication List      START taking these medications        Instructions    Morning Afternoon Evening Bedtime    ferrous sulfate 325 (65 FE) MG EC tablet        Take 1 Tab  by mouth 3 times a day, with meals.   Dose:  325 mg                        ibuprofen 800 MG Tabs   Last time this was given:  600 mg on 7/16/2017  4:56 AM   Commonly known as:  MOTRIN        Take 1 Tab by mouth every 8 hours as needed for Moderate Pain (For cramping after delivery; do not give if patient is receiving ketorolac (Toradol)).   Dose:  800 mg                        oxycodone-acetaminophen 5-325 MG Tabs   Last time this was given:  2 Tabs on 7/16/2017  9:00 AM   Commonly known as:  PERCOCET        Take 1 Tab by mouth every four hours as needed for Severe Pain (for Moderate Pain (Pain Scale 4-6) after delivery).   Dose:  1 Tab                          ASK your doctor about these medications        Instructions    Morning Afternoon Evening Bedtime    PNV 27-0.6-0.4 MG Tabs        Take 1 Tab by mouth. Resume unchanged   Dose:  1 Tab                             Where to Get Your Medications      Information about where to get these medications is not yet available     ! Ask your nurse or doctor about these medications    - ferrous sulfate 325 (65 FE) MG EC tablet  - ibuprofen 800 MG Tabs  - oxycodone-acetaminophen 5-325 MG Tabs            Crisis Hotline:     [place] Crisis Hotline:  7-375-SXFCQCH or 1-724.204.5388    [place] Crisis Hotline:    1-452.917.7600 or 700-376-2811        Disclaimer           _____________________________________                     __________       ________       Patient/Mother Signature or Legal                          Date                   Time

## 2017-07-12 NOTE — IP AVS SNAPSHOT
7/16/2017    Jackelyn Peacee Cristhian  [address] [address]    Dear Jackelyn:    [facility] wants to ensure your discharge home is safe and you or your loved ones have had all of your questions answered regarding your care after you leave the hospital.    Below is a list of resources and contact information should you have any questions regarding your hospital stay, follow-up instructions, or active medical symptoms.    Questions or Concerns Regarding… Contact   Medical Questions Related to Your Discharge  (7 days a week, 8am-5pm) Contact a Nurse Care Coordinator   [phone]   Medical Questions Not Related to Your Discharge  (24 hours a day / 7 days a week)  Contact the Nurse Health Line   [phone]    Medications or Discharge Instructions Refer to your discharge packet   or contact your [facility] [place] Primary Care Provider   [phone]   Follow-up Appointment(s) Schedule your appointment via Codealike   or contact Scheduling [phone]   Billing Review your statement via Codealike  or contact Billing [phone]   Medical Records Review your records via Codealike   or contact Medical Records [phone]     You may receive a telephone call within two days of discharge. This call is to make certain you understand your discharge instructions and have the opportunity to have any questions answered. You can also easily access your medical information, test results and upcoming appointments via the [employer]ike free online health management tool. You can learn more and sign up at [email or web address]. For assistance setting up your Codealike account, please call [phone].    Once again, we want to ensure your discharge home is safe and that you have a clear understanding of any next steps in your care. If you have any questions or concerns, please do not hesitate to contact us, we are here for you. Thank you for choosing [facility] [place] for your healthcare needs.    Sincerely,    Your [facility] [place] Healthcare Team

## 2017-07-13 PROCEDURE — 36415 COLL VENOUS BLD VENIPUNCTURE: CPT

## 2017-07-13 PROCEDURE — 700102 HCHG RX REV CODE 250 W/ 637 OVERRIDE(OP): Performed by: OBSTETRICS & GYNECOLOGY

## 2017-07-13 PROCEDURE — A9270 NON-COVERED ITEM OR SERVICE: HCPCS | Performed by: ANESTHESIOLOGY

## 2017-07-13 PROCEDURE — 700111 HCHG RX REV CODE 636 W/ 250 OVERRIDE (IP): Performed by: NURSE PRACTITIONER

## 2017-07-13 PROCEDURE — 305385 HCHG SURGICAL SERVICES 1/4 HOUR

## 2017-07-13 PROCEDURE — 770002 HCHG ROOM/CARE - OB PRIVATE (112)

## 2017-07-13 PROCEDURE — 303615 HCHG EPIDURAL/SPINAL ANESTHESIA FOR LABOR

## 2017-07-13 PROCEDURE — 700102 HCHG RX REV CODE 250 W/ 637 OVERRIDE(OP): Performed by: NURSE PRACTITIONER

## 2017-07-13 PROCEDURE — A9270 NON-COVERED ITEM OR SERVICE: HCPCS | Performed by: NURSE PRACTITIONER

## 2017-07-13 PROCEDURE — A9270 NON-COVERED ITEM OR SERVICE: HCPCS | Performed by: OBSTETRICS & GYNECOLOGY

## 2017-07-13 PROCEDURE — 0UL70CZ OCCLUSION OF BILATERAL FALLOPIAN TUBES WITH EXTRALUMINAL DEVICE, OPEN APPROACH: ICD-10-PCS | Performed by: OBSTETRICS & GYNECOLOGY

## 2017-07-13 PROCEDURE — 304964 HCHG RECOVERY ROOM TIME 1HR

## 2017-07-13 PROCEDURE — 700105 HCHG RX REV CODE 258

## 2017-07-13 PROCEDURE — 700111 HCHG RX REV CODE 636 W/ 250 OVERRIDE (IP)

## 2017-07-13 PROCEDURE — 700111 HCHG RX REV CODE 636 W/ 250 OVERRIDE (IP): Performed by: ANESTHESIOLOGY

## 2017-07-13 PROCEDURE — 700105 HCHG RX REV CODE 258: Performed by: OBSTETRICS & GYNECOLOGY

## 2017-07-13 PROCEDURE — 700105 HCHG RX REV CODE 258: Performed by: NURSE PRACTITIONER

## 2017-07-13 PROCEDURE — 700105 HCHG RX REV CODE 258: Performed by: ANESTHESIOLOGY

## 2017-07-13 PROCEDURE — 700101 HCHG RX REV CODE 250

## 2017-07-13 PROCEDURE — 306828 HCHG ANES-TIME GENERAL: Performed by: OBSTETRICS & GYNECOLOGY

## 2017-07-13 PROCEDURE — 59514 CESAREAN DELIVERY ONLY: CPT

## 2017-07-13 PROCEDURE — 700102 HCHG RX REV CODE 250 W/ 637 OVERRIDE(OP): Performed by: ANESTHESIOLOGY

## 2017-07-13 RX ORDER — MORPHINE SULFATE 4 MG/ML
4 INJECTION, SOLUTION INTRAMUSCULAR; INTRAVENOUS
Status: DISCONTINUED | OUTPATIENT
Start: 2017-07-13 | End: 2017-07-16 | Stop reason: HOSPADM

## 2017-07-13 RX ORDER — ROPIVACAINE HYDROCHLORIDE 2 MG/ML
INJECTION, SOLUTION EPIDURAL; INFILTRATION; PERINEURAL
Status: COMPLETED
Start: 2017-07-13 | End: 2017-07-13

## 2017-07-13 RX ORDER — SODIUM CHLORIDE, SODIUM LACTATE, POTASSIUM CHLORIDE, CALCIUM CHLORIDE 600; 310; 30; 20 MG/100ML; MG/100ML; MG/100ML; MG/100ML
INJECTION, SOLUTION INTRAVENOUS PRN
Status: DISCONTINUED | OUTPATIENT
Start: 2017-07-13 | End: 2017-07-16 | Stop reason: HOSPADM

## 2017-07-13 RX ORDER — OXYCODONE HYDROCHLORIDE AND ACETAMINOPHEN 5; 325 MG/1; MG/1
1 TABLET ORAL EVERY 4 HOURS PRN
Status: DISCONTINUED | OUTPATIENT
Start: 2017-07-13 | End: 2017-07-16 | Stop reason: HOSPADM

## 2017-07-13 RX ORDER — SODIUM CHLORIDE, SODIUM LACTATE, POTASSIUM CHLORIDE, CALCIUM CHLORIDE 600; 310; 30; 20 MG/100ML; MG/100ML; MG/100ML; MG/100ML
1500 INJECTION, SOLUTION INTRAVENOUS ONCE
Status: COMPLETED | OUTPATIENT
Start: 2017-07-13 | End: 2017-07-14

## 2017-07-13 RX ORDER — OXYCODONE HYDROCHLORIDE AND ACETAMINOPHEN 5; 325 MG/1; MG/1
2 TABLET ORAL EVERY 4 HOURS PRN
Status: DISCONTINUED | OUTPATIENT
Start: 2017-07-13 | End: 2017-07-13

## 2017-07-13 RX ORDER — SODIUM CHLORIDE, SODIUM LACTATE, POTASSIUM CHLORIDE, CALCIUM CHLORIDE 600; 310; 30; 20 MG/100ML; MG/100ML; MG/100ML; MG/100ML
INJECTION, SOLUTION INTRAVENOUS
Status: COMPLETED
Start: 2017-07-13 | End: 2017-07-13

## 2017-07-13 RX ORDER — ONDANSETRON 4 MG/1
4 TABLET, ORALLY DISINTEGRATING ORAL EVERY 6 HOURS PRN
Status: DISCONTINUED | OUTPATIENT
Start: 2017-07-13 | End: 2017-07-16 | Stop reason: HOSPADM

## 2017-07-13 RX ORDER — ACETAMINOPHEN 325 MG/1
325 TABLET ORAL EVERY 4 HOURS PRN
Status: DISCONTINUED | OUTPATIENT
Start: 2017-07-13 | End: 2017-07-16 | Stop reason: HOSPADM

## 2017-07-13 RX ORDER — MISOPROSTOL 200 UG/1
800 TABLET ORAL
Status: DISCONTINUED | OUTPATIENT
Start: 2017-07-13 | End: 2017-07-16 | Stop reason: HOSPADM

## 2017-07-13 RX ORDER — SIMETHICONE 80 MG
80 TABLET,CHEWABLE ORAL 4 TIMES DAILY PRN
Status: DISCONTINUED | OUTPATIENT
Start: 2017-07-13 | End: 2017-07-16 | Stop reason: HOSPADM

## 2017-07-13 RX ORDER — SODIUM CHLORIDE, SODIUM LACTATE, POTASSIUM CHLORIDE, CALCIUM CHLORIDE 600; 310; 30; 20 MG/100ML; MG/100ML; MG/100ML; MG/100ML
INJECTION, SOLUTION INTRAVENOUS CONTINUOUS
Status: DISCONTINUED | OUTPATIENT
Start: 2017-07-13 | End: 2017-07-16 | Stop reason: HOSPADM

## 2017-07-13 RX ORDER — DOCUSATE SODIUM 100 MG/1
100 CAPSULE, LIQUID FILLED ORAL 2 TIMES DAILY PRN
Status: DISCONTINUED | OUTPATIENT
Start: 2017-07-13 | End: 2017-07-16 | Stop reason: HOSPADM

## 2017-07-13 RX ORDER — OXYCODONE HYDROCHLORIDE AND ACETAMINOPHEN 5; 325 MG/1; MG/1
2 TABLET ORAL EVERY 4 HOURS PRN
Status: DISCONTINUED | OUTPATIENT
Start: 2017-07-13 | End: 2017-07-16 | Stop reason: HOSPADM

## 2017-07-13 RX ORDER — IBUPROFEN 600 MG/1
600 TABLET ORAL EVERY 6 HOURS PRN
Status: DISCONTINUED | OUTPATIENT
Start: 2017-07-13 | End: 2017-07-16 | Stop reason: HOSPADM

## 2017-07-13 RX ORDER — METOCLOPRAMIDE HYDROCHLORIDE 5 MG/ML
10 INJECTION INTRAMUSCULAR; INTRAVENOUS ONCE
Status: COMPLETED | OUTPATIENT
Start: 2017-07-13 | End: 2017-07-13

## 2017-07-13 RX ORDER — ONDANSETRON 2 MG/ML
4 INJECTION INTRAMUSCULAR; INTRAVENOUS EVERY 6 HOURS PRN
Status: DISCONTINUED | OUTPATIENT
Start: 2017-07-13 | End: 2017-07-16 | Stop reason: HOSPADM

## 2017-07-13 RX ORDER — OXYCODONE HYDROCHLORIDE AND ACETAMINOPHEN 5; 325 MG/1; MG/1
1 TABLET ORAL EVERY 4 HOURS PRN
Status: DISCONTINUED | OUTPATIENT
Start: 2017-07-13 | End: 2017-07-13

## 2017-07-13 RX ORDER — IBUPROFEN 800 MG/1
800 TABLET ORAL EVERY 8 HOURS PRN
Status: DISCONTINUED | OUTPATIENT
Start: 2017-07-13 | End: 2017-07-13

## 2017-07-13 RX ADMIN — ACETAMINOPHEN 325 MG: 325 TABLET, FILM COATED ORAL at 03:12

## 2017-07-13 RX ADMIN — OXYTOCIN 1 MILLI-UNITS/MIN: 10 INJECTION, SOLUTION INTRAMUSCULAR; INTRAVENOUS at 05:35

## 2017-07-13 RX ADMIN — SODIUM CHLORIDE, POTASSIUM CHLORIDE, SODIUM LACTATE AND CALCIUM CHLORIDE 1000 ML: 600; 310; 30; 20 INJECTION, SOLUTION INTRAVENOUS at 08:45

## 2017-07-13 RX ADMIN — FENTANYL CITRATE 100 MCG: 50 INJECTION, SOLUTION INTRAMUSCULAR; INTRAVENOUS at 06:17

## 2017-07-13 RX ADMIN — SODIUM CHLORIDE, POTASSIUM CHLORIDE, SODIUM LACTATE AND CALCIUM CHLORIDE 1000 ML: 600; 310; 30; 20 INJECTION, SOLUTION INTRAVENOUS at 18:32

## 2017-07-13 RX ADMIN — SODIUM CHLORIDE, POTASSIUM CHLORIDE, SODIUM LACTATE AND CALCIUM CHLORIDE: 600; 310; 30; 20 INJECTION, SOLUTION INTRAVENOUS at 05:38

## 2017-07-13 RX ADMIN — FENTANYL CITRATE 100 MCG: 50 INJECTION, SOLUTION INTRAMUSCULAR; INTRAVENOUS at 03:26

## 2017-07-13 RX ADMIN — METOCLOPRAMIDE 10 MG: 5 INJECTION, SOLUTION INTRAMUSCULAR; INTRAVENOUS at 18:25

## 2017-07-13 RX ADMIN — SODIUM CITRATE AND CITRIC ACID MONOHYDRATE 30 ML: 500; 334 SOLUTION ORAL at 18:26

## 2017-07-13 RX ADMIN — ACETAMINOPHEN 325 MG: 325 TABLET, FILM COATED ORAL at 16:05

## 2017-07-13 RX ADMIN — FAMOTIDINE 20 MG: 10 INJECTION INTRAVENOUS at 18:25

## 2017-07-13 RX ADMIN — OXYTOCIN 350 ML/HR: 10 INJECTION, SOLUTION INTRAMUSCULAR; INTRAVENOUS at 18:22

## 2017-07-13 RX ADMIN — SODIUM CHLORIDE, POTASSIUM CHLORIDE, SODIUM LACTATE AND CALCIUM CHLORIDE: 600; 310; 30; 20 INJECTION, SOLUTION INTRAVENOUS at 09:30

## 2017-07-13 RX ADMIN — SODIUM CHLORIDE, SODIUM LACTATE, POTASSIUM CHLORIDE, CALCIUM CHLORIDE AND DEXTROSE MONOHYDRATE: 5; 600; 310; 30; 20 INJECTION, SOLUTION INTRAVENOUS at 15:17

## 2017-07-13 RX ADMIN — ROPIVACAINE HYDROCHLORIDE 10 ML/HR: 2 INJECTION, SOLUTION EPIDURAL; INFILTRATION at 09:55

## 2017-07-13 RX ADMIN — OXYCODONE HYDROCHLORIDE AND ACETAMINOPHEN 2 TABLET: 5; 325 TABLET ORAL at 23:51

## 2017-07-13 RX ADMIN — ROPIVACAINE HYDROCHLORIDE 10 ML/HR: 2 INJECTION, SOLUTION EPIDURAL; INFILTRATION at 16:40

## 2017-07-13 ASSESSMENT — PAIN SCALES - GENERAL
PAINLEVEL_OUTOF10: 0

## 2017-07-13 ASSESSMENT — LIFESTYLE VARIABLES: DO YOU DRINK ALCOHOL: NO

## 2017-07-13 NOTE — PROGRESS NOTES
Report received from Katie Garcia. POC discussed. Pt resting comfortably. Declines interventions at this time.     KEO chilel at bedside at 0530. SVE. Orders received to start pitocin (see mar). Pt requesting fentanyl (see mar).    Report given to Tony GARCIA.

## 2017-07-13 NOTE — CARE PLAN
Problem: Risk for Infection, Impaired Wound Healing  Goal: Remain free from signs and symptoms of infection  Outcome: PROGRESSING AS EXPECTED  Patient remains afebrile. No S&S of infections     Problem: Risk for injury  Goal: Patient and fetus will be free of preventable injury/complications  Outcome: PROGRESSING AS EXPECTED  Patient is on continuous fetal monitoring for fetal well being and uterine activity. States positive fetal movement.

## 2017-07-13 NOTE — PROGRESS NOTES
1952  EDC  40w5d. Pt presents to L&D for OP Gel. Pt taken to triage.      TOCO and EFM applied, BP elevated, will take serial BP for further assessment.      Unable to reach Olga LOVE by phone, will try again     Pt up to give urine sample     Dr. Gonzalez and Olga LOVE at bedside, BP reviewed. Orders to admit patient for inpatient gel.     SVE 1/thick    2112 Pt transferred to labor room    013 Report given to Robina MEYER, POC discussed

## 2017-07-13 NOTE — H&P
History and Physical    Jackelyn Morrow is a 37 y.o. female  -Para:     Gestational Age:  40w5d  Admitted for:   Induction of Labor  Admitted to  [facility].  [facility]    HPI: Patient is admitted with the above mentioned Chief Complaint and States   Loss of fluid:   negative  Abdominal Pain:  negative  Uterine Contractions:  negative  Vaginal Bleeding:  negative  Fetal Movement:  normal  Patient denies fever, chills, nausea, vomiting , headache, visual disturbance, or dysuria  Patient's last menstrual period was 2016.  Estimated Date of Delivery: 17  Final JOEL: 2017, by Last Menstrual Period    Patient Active Problem List    Diagnosis Date Noted   • Multigravida of advanced maternal age 2017   • Encounter for supervision of other normal pregnancy 2010       Admitting DX: Pregnancy  Pregnancy Complications:  PIH  OB Risk Factors:   PIH  Labor State:    Not in labor.    History:   has a past medical history of Meningitis and Abnormal Pap smear (). She also has no past medical history of Allergy, Adrenal disorder (CMS-[facility]), Addisons disease (CMS-HCC), Anemia, Anxiety, Arrhythmia, Arthritis, Asthma, Blood transfusion without reported diagnosis, Cancer (CMS-HCC), Cataract, CHF (congestive heart failure) (CMS-HCC), Clotting disorder (CMS-HCC), COPD (chronic obstructive pulmonary disease) (CMS-HCC), Cushings syndrome (CMS-HCC), Depression, Diabetes (CMS-HCC), Diabetic neuropathy (CMS-HCC), Pulmonary emphysema (CMS-HCC), GERD (gastroesophageal reflux disease), Glaucoma, Goiter, Heart attack (CMS-HCC), Heart murmur, HIV (human immunodeficiency virus infection) (CMS-HCC), Hyperlipidemia, IBD (inflammatory bowel disease), Hypertension, Kidney disease, Migraine, Muscle disorder, Osteoporosis, Parathyroid disorder (CMS-HCC), Pituitary disease (CMS-HCC), Seizure (CMS-HCC), Sickle cell disease (CMS-HCC), Stroke (CMS-HCC),  "Substance abuse, Tuberculosis, Ulcer (CMS-HCC), or Urinary tract infection, site not specified.     has past surgical history that includes abdominal exploration (2016); cholecystectomy; and adenoidectomy.    OB History    Para Term  AB SAB TAB Ectopic Multiple Living   3 2 2       2      # Outcome Date GA Lbr [place]/2nd Weight Sex Delivery Anes PTL Lv   3 Current            2 Term 06/23/10 40w0d  3.515 kg (7 lb 12 oz) M Vag-Spont EPI N Y      Comments: Pt states no complications.    1 Term 02 40w0d  3.062 kg (6 lb 12 oz) M Vag-Spont EPI N Y      Comments: nno complications          Medications:  No current facility-administered medications on file prior to encounter.     Current Outpatient Prescriptions on File Prior to Encounter   Medication Sig Dispense Refill   • Prenatal Vit w/Si-Pzswuqlmp-PU (PNV) 27-0.6-0.4 MG TABS Take 1 Tab by mouth. Resume unchanged         Allergies:  Review of patient's allergies indicates no known allergies.    ROS:   Neuro: negative    Cardiovascular: negative  Gastro intestinal: negative  Genitourinary: negative            Physical Exam:  /86 mmHg  Pulse 75  Temp(Src) 36.6 °C (97.9 °F)  Ht 1.6 m (5' 3\")  Wt 98.884 kg (218 lb)  BMI 38.63 kg/m2  LMP 2016  Constitutional: healthy-appearing, Well-developed, well-nourished, moderately obese, in no acute distress  No JVD: while supine  HEENT: EOMI, Thyroid without masses and Trachea midline  Breast Exam: negative  Cardio: regular rate and rhythm  Lung: unlabored respirations, no intercostal retractions or accessory muscle use, clear to auscultation without rales or wheezes  Abdomen: abdomen is soft without significant tenderness, masses, organomegaly or guarding  Extremity: extremities, peripheral pulses and reflexes normal, no edema, redness or tenderness in the calves or thighs, feet normal, good pulses, normal color, temperature and sensation    Cervical Exam: 10%  Cervix Dilatation: 1  Station: " negative 2  Pelvis: Normal  Fetal Assessment: Fetal heart variability: moderate  Fetal Heart Rate decelerations: none  Fetal Heart Rate accelerations: yes  Baseline FHR: 125 per minute  Uterine contractions: none  Estimated Fetal Weight: 3500 - 4000g      Labs:      Prenatal Results         1st Trimester Date Time   ABO  O  17    RH  POS  17    Antibody  NEG  17    CBC/PLT/DIFF      HGB  12.0  17    Platelets  185  k/uL  17    HGB A1C       1 Hr GCT  112  17    3 Hr GTT      Rubella  IMMUNE  17    RPR  Not Detected  17    Urine Culture      24 Urine Protein       24 Urine Creatinine       HBsAg  NEG  17    Hep CAB       HIV  NON REACTIVE  17    Gonorrhea  NEG  01/10/17    Chlamydia  NEG  01/10/17    TSH       Free T4        TB      Pap  NEG  17    SYPHILUS TREP QUAL [id number] [[id number]][      2nd Trimester Date Time   HCT  36.8  17    HGB  12.0  17    1 Hr GCT  112  17    3 Hr GTT      24-28 Weeks Date Time   1 Hr GCT   112  17    TSH       Free T4       24 Urine Protein      24 Urine Creatinine      BUN      Creatinine      GFR      AST      ALT      Uric Acid      LDH      3rd Trimester Date Time   HCT  36.8  17    HGB  12.0  17    TSH      Free T4      24 Hr Urine Protein      24 Hr Urine Creatinine      SYPHILUS TREP QUAL      35-37 Weeks Date Time   GBS PCR LB  NEG  17    GBS PCR  NEG  17    Genetic Screening Date Time   Cystic Fibrosis      AFP Quad      Sickle Cell                  View all results for this pregnancy            Assessment:  Gestational Age:  40w5d  Labor State:   Labor, Active  Risk Factors:   PIH  Pregnancy Complications: None    Patient Active Problem List    Diagnosis Date Noted   • Multigravida of advanced maternal age 2017   • Encounter for supervision of other normal pregnancy 2010       Plan:   Admitted for: Induction of Labor- Postdates, PIH    CBC and  CMP  routine urinalysis, urine drug screen  Antibiotics: Not indicated at this time    Patient is here for planned OP gel, but BP is markedly elevated, but asymptomatic (2+DTR's, Neg clonus, Denies HA or visual changes).  Will admit for IP IOL starting with prostaglandin gel  Discussed need for medication to lower BP and possibility of magnesium sulfate for seizure prophylaxis.    Puja Jackson C.N.M.

## 2017-07-13 NOTE — PROGRESS NOTES
07- report received from LISET Gonzales RN. Plan of care discussed.     830- patient states she wants a epidural. Patient LR bolus started. Patient breathing through contractions. Increase in blood pressures. Serial BP's started.     900- Dr Lucille Banuelos updated on patient blood pressures. Will continue to monitor. Will reevaluate after epidural.     944- Dr Holland at bedside. Epidural placed. Serial BP's started    1010- Yeison Soham med student and RN. SVE. As noted.     1015- iverson placed to gravity. clear yellow urine seen.    1235-Dr Burt into see patient. SVE as noted. RN preformed SVE to confirm. Patient SVE 4-5cm/ ballot/-2. While preforming exam patient SROM large amount of moderate mec. Dr Lucille Banuelos updated.    1315- Dr Burt at bedside. SVE no change. IUPC placed.     1355- Dr Zechariah banuelos updated about patient fetal tracing regarding late decelerations despite position changes. In to see patient. SVE 5-6/80/-2. Pitocin off. O2 at 10 L via mask. Patient in throne position with wedged to right side.     1515- Dr Burt updated.strip reviewed. Pitocin restarted.     1600- Dr Burt at bedside. Had RN preform exam. Patient is SVE 6-7/ 90/-2. Small amount of bloody fluid seen. Will continue to monitor.     1630- patient having late decelerations. Patient positions changed.     1700- Dr Burt updated on fetal heart rate continue with plan of care.     1750- Dr Lucille Banuelos in to see patient. Strip reviewed, no cervical change. Discussed with patient the need for a . Patient and  agree with plan of care. Patient request a bilateral tubal ligation. Patient prep for c/s.    - waiting on availability of anesthesia. Dr Lucille Banuelos updated     190- report given to ANATOLY Hoskins RN at bedside.

## 2017-07-13 NOTE — PROGRESS NOTES
L&D Progress Note    Name:   Jackelyn Morrow   Date/Time:  7/13/2017 8:26 AM  Gestational Age:  40w6d  Admit Date:   7/12/2017  Admitting Dx:   Pregnancy  Labor and delivery indication for care or intervention    Subjective:  Uterine contractions: yes  Pain:    no  Complaints:   no   Headache: .  no  Blurred vision:  no   SOB:    no   Nausea/vomiting:  no  Epigastric pain:  no  Fetal movement:  normal  Vaginal bleeding: . no    Objective:   Filed Vitals:    07/13/17 0630 07/13/17 0707 07/13/17 0757 07/13/17 0814   BP: 144/79 149/80 161/87 167/102   Pulse: 67 67 66 67   Temp:  36.1 °C (97 °F) 36.3 °C (97.4 °F)    TempSrc:  Temporal Temporal    Resp:  18 18    Height:       Weight:         Fetal heart variability: moderate  Fetal Heart Rate decelerations: none  Fetal Heart Rate accelerations: yes  Baseline FHR: 140 per minute  Uterine contractions: regular, every 2-4 minutes  Cervical: Long ;  Dilatation .2-3 ; Station negative3    Fetal position:   Cephalic  Membranes ruptured: no  AROM:  N\A  Meconium: .  N\A    IUPC: .   N\A  FSE .   N\A    Meds:   Epidural : .  no  Magnesium sulfate: . no  Pitocin: .  yes    Labs:  Recent Results (from the past 72 hour(s))   Urine drug screen    Collection Time: 07/12/17  8:10 PM   Result Value Ref Range    Amphetamines Urine Negative Negative    Barbiturates Negative Negative    Benzodiazepines Negative Negative    Cocaine Metabolite Negative Negative    Methadone Negative Negative    Opiates Negative Negative    Oxycodone Negative Negative    Phencyclidine -Pcp Negative Negative    Propoxyphene Negative Negative    Cannabinoid Metab Negative Negative   POC UA    Collection Time: 07/12/17  9:13 PM   Result Value Ref Range    POC Color Amisha     POC Appearance Cloudy (A)     POC Glucose Negative Negative mg/dL    POC Ketones Negative Negative mg/dL    POC Specific Gravity >=1.030 (A) 1.005-1.030    POC Blood Trace-intact (A) Negative    POC Urine PH 5.5 5.0-8.0    POC Protein  Trace (A) Negative mg/dL    POC Nitrites Negative Negative    POC Leukocyte Esterase Small (A) Negative   CBC WITH DIFFERENTIAL    Collection Time: 07/12/17  9:30 PM   Result Value Ref Range    WBC 11.1 (H) 4.8 - 10.8 K/uL    RBC 3.67 (L) 4.20 - 5.40 M/uL    Hemoglobin 11.6 (L) 12.0 - 16.0 g/dL    Hematocrit 35.6 (L) 37.0 - 47.0 %    MCV 97.0 81.4 - 97.8 fL    MCH 31.6 27.0 - 33.0 pg    MCHC 32.6 (L) 33.6 - 35.0 g/dL    RDW 50.4 (H) 35.9 - 50.0 fL    Platelet Count 126 (L) 164 - 446 K/uL    MPV 10.7 9.0 - 12.9 fL    Neutrophils-Polys 70.30 44.00 - 72.00 %    Lymphocytes 15.40 (L) 22.00 - 41.00 %    Monocytes 11.00 0.00 - 13.40 %    Eosinophils 2.00 0.00 - 6.90 %    Basophils 0.40 0.00 - 1.80 %    Immature Granulocytes 0.90 0.00 - 0.90 %    Nucleated RBC 0.00 /100 WBC    Neutrophils (Absolute) 7.77 (H) 2.00 - 7.15 K/uL    Lymphs (Absolute) 1.70 1.00 - 4.80 K/uL    Monos (Absolute) 1.22 (H) 0.00 - 0.85 K/uL    Eos (Absolute) 0.22 0.00 - 0.51 K/uL    Baso (Absolute) 0.04 0.00 - 0.12 K/uL    Immature Granulocytes (abs) 0.10 0.00 - 0.11 K/uL    NRBC (Absolute) 0.00 K/uL   URIC ACID    Collection Time: 07/12/17  9:30 PM   Result Value Ref Range    Uric Acid 4.0 1.9 - 8.2 mg/dL   COMP METABOLIC PANEL    Collection Time: 07/12/17  9:30 PM   Result Value Ref Range    Sodium 133 (L) 135 - 145 mmol/L    Potassium 3.8 3.6 - 5.5 mmol/L    Chloride 107 96 - 112 mmol/L    Co2 17 (L) 20 - 33 mmol/L    Anion Gap 9.0 0.0 - 11.9    Glucose 83 65 - 99 mg/dL    Bun 9 8 - 22 mg/dL    Creatinine 0.50 0.50 - 1.40 mg/dL    Calcium 9.5 8.5 - 10.5 mg/dL    AST(SGOT) 14 12 - 45 U/L    ALT(SGPT) 7 2 - 50 U/L    Alkaline Phosphatase 118 (H) 30 - 99 U/L    Total Bilirubin 0.3 0.1 - 1.5 mg/dL    Albumin 3.1 (L) 3.2 - 4.9 g/dL    Total Protein 6.3 6.0 - 8.2 g/dL    Globulin 3.2 1.9 - 3.5 g/dL    A-G Ratio 1.0 g/dL   Hold Blood Bank Specimen (Not Tested)    Collection Time: 07/12/17  9:30 PM   Result Value Ref Range    Holding Tube - Bb DONE     ESTIMATED GFR    Collection Time: 07/12/17  9:30 PM   Result Value Ref Range    GFR If African American >60 >60 mL/min/1.73 m 2    GFR If Non African American >60 >60 mL/min/1.73 m 2         Assessment:   Gestational Age:   40w6d  Risk Factors:   PIH  Labor State:    Early latent labor.  Pregnancy Complications: PIH  Plan:    Continue present management Start pitocin. Epidural as desired. Monitor BP for severity and need for additional medications    Patient Active Problem List    Diagnosis Date Noted   • Multigravida of advanced maternal age 06/22/2017   • Encounter for supervision of other normal pregnancy 04/20/2010     Pit started  Epidural PRN  Monitor BP, poss Mag in active labor    LINDY RoachN.M.

## 2017-07-14 LAB
ERYTHROCYTE [DISTWIDTH] IN BLOOD BY AUTOMATED COUNT: 49.2 FL (ref 35.9–50)
HCT VFR BLD AUTO: 28.4 % (ref 37–47)
HGB BLD-MCNC: 9.3 G/DL (ref 12–16)
MCH RBC QN AUTO: 31.4 PG (ref 27–33)
MCHC RBC AUTO-ENTMCNC: 32.7 G/DL (ref 33.6–35)
MCV RBC AUTO: 95.9 FL (ref 81.4–97.8)
PLATELET # BLD AUTO: 111 K/UL (ref 164–446)
PMV BLD AUTO: 10.9 FL (ref 9–12.9)
RBC # BLD AUTO: 2.96 M/UL (ref 4.2–5.4)
WBC # BLD AUTO: 14.7 K/UL (ref 4.8–10.8)

## 2017-07-14 PROCEDURE — 36415 COLL VENOUS BLD VENIPUNCTURE: CPT

## 2017-07-14 PROCEDURE — 85027 COMPLETE CBC AUTOMATED: CPT

## 2017-07-14 PROCEDURE — 770002 HCHG ROOM/CARE - OB PRIVATE (112)

## 2017-07-14 PROCEDURE — 700102 HCHG RX REV CODE 250 W/ 637 OVERRIDE(OP): Performed by: OBSTETRICS & GYNECOLOGY

## 2017-07-14 PROCEDURE — A9270 NON-COVERED ITEM OR SERVICE: HCPCS | Performed by: OBSTETRICS & GYNECOLOGY

## 2017-07-14 RX ADMIN — SIMETHICONE CHEW TAB 80 MG 80 MG: 80 TABLET ORAL at 14:43

## 2017-07-14 RX ADMIN — IBUPROFEN 600 MG: 600 TABLET, FILM COATED ORAL at 08:26

## 2017-07-14 RX ADMIN — OXYCODONE HYDROCHLORIDE AND ACETAMINOPHEN 1 TABLET: 5; 325 TABLET ORAL at 22:54

## 2017-07-14 RX ADMIN — OXYCODONE HYDROCHLORIDE AND ACETAMINOPHEN 1 TABLET: 5; 325 TABLET ORAL at 13:40

## 2017-07-14 RX ADMIN — OXYCODONE HYDROCHLORIDE AND ACETAMINOPHEN 2 TABLET: 5; 325 TABLET ORAL at 04:42

## 2017-07-14 RX ADMIN — OXYCODONE HYDROCHLORIDE AND ACETAMINOPHEN 1 TABLET: 5; 325 TABLET ORAL at 17:42

## 2017-07-14 RX ADMIN — IBUPROFEN 600 MG: 600 TABLET, FILM COATED ORAL at 17:43

## 2017-07-14 ASSESSMENT — PAIN SCALES - GENERAL
PAINLEVEL_OUTOF10: 6
PAINLEVEL_OUTOF10: 5
PAINLEVEL_OUTOF10: 5
PAINLEVEL_OUTOF10: 8
PAINLEVEL_OUTOF10: 7
PAINLEVEL_OUTOF10: 3

## 2017-07-14 ASSESSMENT — LIFESTYLE VARIABLES: DO YOU DRINK ALCOHOL: NO

## 2017-07-14 NOTE — OP REPORT
DATE OF PROCEDURE:  2017    POST-OP DIAGNOSIS: Term IOL for PIH, postdates                                           Failure to Progress, Fetal intolerance to Labor                                          Undesirous of fertility    PROCEDURE: PRIMARY LOW TRANSVERSE  SECTION VIA PFANNENSTIEL                            BILATERAL TUBAL LIGATION WITH FILSHSHARRI    SURGEON: MADINA LEDEZMA     ASSISTANT: ELZBIETA AMADOR    ANESTHESIOLOGIST: NEFTALY LEDEZMA    TYPE OF ANESTHESIA: EPIDURAL    SPECIMEN: CORD GAS    EBL: 700 CC      UO: CLEAR    FINDINGS:   Delivered to LB GIRL; VERTEX presentation; BW: 8 LB 9.9 OZ   AS: .   Meconium-stained amniotic fluid   Placenta delivered manually/spontaneously complete and intact, 3VC   Uterus, bilateral ovaries and tubes grossly normal    DESCRIPTION OF PROCEDURE:      Patient and family discussed about the risks, indications, alternatives and complications of the procedure. No further questions. Signed consents.   Patient is taken to the operating room where spinal anesthesia induction was done without difficulty. She is then placed in a supine position with a leftward tilt, prepped and draped in a normal usual sterile fashion. After adequate level of anesthesia has been ascertained, Pfannenstiel incision is then made on the skin and carried down to the underlying layer of fascia. Fascia is nicked in the midline and incision carried down bilaterally sharply. Superior and inferior aspects of fascial incision is clamped with Kocher's, elevated, tented up and dissected off bluntly from underlying rectus muscle. Rectus muscle is then  in the midline and peritoneum entered sharply and extended cephalo-caudally. Clemente 0 used and good visualization of the lower uterine segment afforded.  Identification of the vesico-uterine peritoneum and creation of bladder flap to put down the bladder were done  Low transverse incision is then made at the lower uterine segment and extended  bilaterally digitally. Amniotomy is clear. Infant is delivered in vertex presentation, good suctioning of the nose and mouth is done and rest of the body delivered atraumatically. Cord clamped and cut and handed off to the RN in attendance for further care.   Placenta delivered complete and intact, 3 VC.  The cavity is cleansed of all clots and debris.   Hysterotomy incision is repaired using Chromic 1 in a continuous interlocking fashion with excellent hemostasis.   Bilateral tubal ligation is then undertaken wherein the avascular mesosalpinx is grasped and filshie clip applied ascertaining that it traversed the entire transverse segment of the tube. Same procedure done on the contralateral side.   Uterus is returned into the abdomen. Pelvic gutters cleared of clots and debris.   Peritoneal layer closed in simple continuous manner using Vicryl 2-0.  Rectus muscle reapproximated in the midline using chromic 1.  Fascial layer repaired using Vicryl 0 in the simple continuous fashion.  Subcutaneous layer reapproximated using Vicryl 3-0.  Skin closed with staples.   All layers are hemostatic.  Procedure is terminated. Sponges, Laps and needles count correct x 3.  Patient is taken to the PACU, awake and in good condition.

## 2017-07-14 NOTE — PROGRESS NOTES
Name:   Jackelyn Morrow   Date/Time:  2017 7:09 AM  Gestational Age:  41w0d  Admit Date:   2017  Admitting Dx:   Pregnancy  Labor and delivery indication for care or intervention  P c/s w/ BTL for late decelerations and no cervical change    POD# 1 S/P  and BTL    S:  Abdominal pain Yes, at incision site.  Ambulating   yes  Tolerating PO  yes  Flatus    no  Bleeding   no  Voiding   yes   Dizziness   no  Breast feeding  yes  Breast tenderness  no    O:  Pulse: 73, Heart Rate (Monitored): 81  Blood Pressure: 129/85 mmHg, NIBP: 142/76 mmHg     Temp  Av.4 °C (97.6 °F)  Min: 36.1 °C (97 °F)  Max: 36.7 °C (98 °F)  Heart: regular rate and rhythm without gallops or murmurs  Lungs: clear bases  Abdomen: flat and soft/nontender / bowelsounds present / incision clean and dry.  Extremities: non-tender  Catheter: DC'd    Intake/Output Summary (Last 24 hours) at 17 0709  Last data filed at 17 0600   Gross per 24 hour   Intake   2875 ml   Output   2250 ml   Net    625 ml       A:  POD# 1 S/P  and BTL  Stable/progressing well, pain well managed.    P:  Routine C/S Postpartum care, continue pain management, encourage ambulation, anticipate DC POD#3-4     Kalia Burt D.O.

## 2017-07-14 NOTE — PROGRESS NOTES
1900: Report received from SOLITARIO Gooden RN. Pt prepped for C/S  1903: Pt in OR 1  1917: Delivery of viable, female infant. Apgars 7/9  1954: Pt in PACU bed 1  2055: Pt and infant transferred to postaprtum in stable condition. Report given Randa MEYER. ID bands verified. Cuddles activated.

## 2017-07-14 NOTE — PROGRESS NOTES
Pt is comfortable    VSS  (+) occasional late deceleration, good variability  (+) uterine activity q 2-3 mins  Cervix - 6-7/90/-2 - unchanged    A/P: recommend primary LTCS with BTL for failure to progress and fetal intolerance to labor  Discussed with the patient indications for  delivery. The patient voiced understanding of indications for  section at this time.    Discussed with the patient the risks of  delivery. The risks include infection, bleeding, scarring, damage to other organs in the area of operation. Specifically organs that can be damaged are bowel, bladder, ureters. I also discussed with the patient the risk of wound infection and wound breakdown. We discussed that these risks are greater in people that have diabetes or obesity. I also discussed the risk of emergency blood transfusion during procedure as well as emergency hysterectomy during procedure.    Discussed today with a risks of tubal ligation. I discussed that the tubal is considered a permanent procedure and the patient will no longer be able to bear children following a tubal. I discussed other forms of birth control which include pills, barrier methods, Depo-Provera, IUD or male sterilization. We discussed  medications or procedures are nonpermanent nor are they surgical. I also discussed the risk of tubal failure with the patient which is one in 200 procedures. We discussed that should the tubal fail there is a risk for ectopic pregnancy which may require surgical intervention.    Patient had the opportunity to ask questions regarding procedures. All questions answered to the patient's satisfaction.  Proceed with  delivery and tubal ligation

## 2017-07-14 NOTE — CARE PLAN
Problem: Safety  Goal: Will remain free from falls  Outcome: PROGRESSING AS EXPECTED  Fall precautions in place: treaded slipper socks on, mobility signs posted, hourly rounding, bed in lowest position, belongings and call light are within reach    Problem: Pain Management  Goal: Pain level will decrease to patient’s comfort goal  Outcome: PROGRESSING AS EXPECTED  Pain managed well with PRN medication at this time.

## 2017-07-15 PROCEDURE — A9270 NON-COVERED ITEM OR SERVICE: HCPCS | Performed by: OBSTETRICS & GYNECOLOGY

## 2017-07-15 PROCEDURE — 700112 HCHG RX REV CODE 229: Performed by: OBSTETRICS & GYNECOLOGY

## 2017-07-15 PROCEDURE — 700102 HCHG RX REV CODE 250 W/ 637 OVERRIDE(OP): Performed by: OBSTETRICS & GYNECOLOGY

## 2017-07-15 PROCEDURE — 770002 HCHG ROOM/CARE - OB PRIVATE (112)

## 2017-07-15 RX ADMIN — OXYCODONE HYDROCHLORIDE AND ACETAMINOPHEN 1 TABLET: 5; 325 TABLET ORAL at 03:49

## 2017-07-15 RX ADMIN — OXYCODONE HYDROCHLORIDE AND ACETAMINOPHEN 2 TABLET: 5; 325 TABLET ORAL at 13:16

## 2017-07-15 RX ADMIN — IBUPROFEN 600 MG: 600 TABLET, FILM COATED ORAL at 06:05

## 2017-07-15 RX ADMIN — IBUPROFEN 600 MG: 600 TABLET, FILM COATED ORAL at 13:16

## 2017-07-15 RX ADMIN — IBUPROFEN 600 MG: 600 TABLET, FILM COATED ORAL at 00:19

## 2017-07-15 RX ADMIN — IBUPROFEN 600 MG: 600 TABLET, FILM COATED ORAL at 20:39

## 2017-07-15 RX ADMIN — DOCUSATE SODIUM 100 MG: 100 CAPSULE ORAL at 13:16

## 2017-07-15 RX ADMIN — OXYCODONE HYDROCHLORIDE AND ACETAMINOPHEN 1 TABLET: 5; 325 TABLET ORAL at 20:39

## 2017-07-15 ASSESSMENT — PAIN SCALES - GENERAL
PAINLEVEL_OUTOF10: 0
PAINLEVEL_OUTOF10: 0
PAINLEVEL_OUTOF10: 2
PAINLEVEL_OUTOF10: 5
PAINLEVEL_OUTOF10: 7
PAINLEVEL_OUTOF10: 2
PAINLEVEL_OUTOF10: 0

## 2017-07-15 NOTE — PROGRESS NOTES
"Received  report  from \"BETSY Tolentino\"  on patient and assumed care. Pt advised Randa to give me report in hallway because she is currently in shower per Randa.    "

## 2017-07-15 NOTE — CARE PLAN
Problem: Altered physiologic condition related to postoperative  delivery  Goal: Patient physiologically stable as evidenced by normal lochia, palpable uterine involution and vital signs within normal limits  Outcome: MET Date Met:  07/15/17  Patient is physiologically stable as evidenced by light lochia, and firm fundus. Vital signs are within normal parameters. Will continue to monitor.     Problem: Alteration in comfort related to surgical incision and/or after birth pains  Goal: Patient is able to ambulate, care for self and infant with acceptable pain level  Patient is able to ambulate and care for self and infant with acceptable pain level as managed with medications. Will continue to monitor

## 2017-07-15 NOTE — PROGRESS NOTES
POSTOPERATIVE  SECTION PROGRESS NOTE;        PATIENT ID:  NAME:  Jackelyn Morrow  MRN:               [id number]  YOB: 1980     37 y.o. female  at 41w1d POD# 2 s/p PRIMARY CS,BTL       Subjective: DOING WELL    Objective:    Filed Vitals:    17 0000 17 0400 17 0800 17 1933   BP:  129/85 129/77 122/75   Pulse:  73 74 90   Temp:  36.1 °C (97 °F) 36.9 °C (98.5 °F) 36.8 °C (98.2 °F)   TempSrc:       Resp:  18 16 18   Height:       Weight:       SpO2: 96% 97% 95% 95%     General: No acute distress, resting comfortably in bed.  HEENT: normocephalic, nontraumatic, PERRLA, EOMI  Cardiovascular: Heart RRR with no murmurs, rubs or gallops. Distal Pulses 2+  Respiratory: symmetric chest expansion, lungs CTA bilaterally with no wheezes rales or rhonci  Abdomen: soft, mildly tender around incision which is clean, dry and intact, fundus firm, +BS  Genitourinary: lochia light, denies excessive vaginal bleeding  Musculoskeletal: strength 5/5 in four extremities  Neuro: non focal with no numbness, tingling or changes in sensation      Recent Labs      17   0349   WBC  11.1*  14.7*   RBC  3.67*  2.96*   HEMOGLOBIN  11.6*  9.3*   HEMATOCRIT  35.6*  28.4*   MCV  97.0  95.9   MCH  31.6  31.4   RDW  50.4*  49.2   PLATELETCT  126*  111*   MPV  10.7  10.9   NEUTSPOLYS  70.30   --    LYMPHOCYTES  15.40*   --    MONOCYTES  11.00   --    EOSINOPHILS  2.00   --    BASOPHILS  0.40   --      Recent Labs      17   SODIUM  133*   POTASSIUM  3.8   CHLORIDE  107   CO2  17*   GLUCOSE  83   BUN  9       Current Meds:   Current Facility-Administered Medications   Medication Dose Frequency Provider Last Rate Last Dose   • acetaminophen (TYLENOL) tablet 325 mg  325 mg Q4HRS PRN Puja Jackson, C.N.M.   325 mg at 17 1605   • lactated ringers infusion   Continuous Apryl Rogers M.D. 125 mL/hr at 17 0930     • LR infusion   PRN Apryl Adkins  TONYA Rogers       • PRN oxytocin (PITOCIN) (20 Units/1000 mL) PRN for excessive uterine bleeding - See Admin Instr  125-999 mL/hr Once PRN Apryl Rogers M.D.       • misoprostol (CYTOTEC) tablet 800 mcg  800 mcg Once PRN Apryl Rogers M.D.       • docusate sodium (COLACE) capsule 100 mg  100 mg BID PRN Apryl Rogers M.D.       • simethicone (MYLICON) chewable tab 80 mg  80 mg 4X/DAY PRN Apryl Rogers M.D.   80 mg at 07/14/17 1443   • ibuprofen (MOTRIN) tablet 600 mg  600 mg Q6HRS PRN Apryl Rogers M.D.   600 mg at 07/15/17 0019   • oxycodone-acetaminophen (PERCOCET) 5-325 MG per tablet 1 Tab  1 Tab Q4HRS PRN Apryl Rogers M.D.   1 Tab at 07/15/17 0349   • oxycodone-acetaminophen (PERCOCET) 5-325 MG per tablet 2 Tab  2 Tab Q4HRS PRN Apryl Rogers M.D.   2 Tab at 07/14/17 0442   • ondansetron (ZOFRAN) syringe/vial injection 4 mg  4 mg Q6HRS PRN Aprly Rogers M.D.        Or   • ondansetron (ZOFRAN ODT) dispertab 4 mg  4 mg Q6HRS PRN Apryl Rogers M.D.       • morphine (pf) 4 mg/ml injection 4 mg  4 mg Q3HRS PRN Apryl Rogers M.D.       • oxytocin (PITOCIN) infusion (for postpartum)   mL/hr Continuous Puja Jackson, C.N.M. 350 mL/hr at 07/13/17 1822 350 mL/hr at 07/13/17 1822   • hydrALAZINE (APRESOLINE) injection 5-10 mg  5-10 mg PRN Puja Jackson, C.N.M.   5 mg at 07/12/17 2137    Or   • labetalol (NORMODYNE,TRANDATE) injection 20-40 mg  20-40 mg PRN Puja Jackson C.N.M.         Last reviewed on 7/12/2017  9:27 PM by Helene Costello R.N.        Assessment:  37 y.o. female  at 41w1d POD#2 s/p PRIMARY CS,BTL     Plan:   1. ROUTINE CARE  2. Disposition: HOME POD3    Tigre Ty MD

## 2017-07-15 NOTE — PROGRESS NOTES
Received report from day shift, RN. Patient in bed sleeping. No concerns at this time. Will continue to monitor.

## 2017-07-15 NOTE — PROGRESS NOTES
Pt removed dressing in shower this am.  Pt states she is passing flatus now.  Already on regular diet.

## 2017-07-16 VITALS
SYSTOLIC BLOOD PRESSURE: 133 MMHG | BODY MASS INDEX: 38.62 KG/M2 | OXYGEN SATURATION: 96 % | RESPIRATION RATE: 16 BRPM | WEIGHT: 218 LBS | HEIGHT: 63 IN | HEART RATE: 79 BPM | DIASTOLIC BLOOD PRESSURE: 82 MMHG | TEMPERATURE: 98.4 F

## 2017-07-16 PROCEDURE — A9270 NON-COVERED ITEM OR SERVICE: HCPCS | Performed by: OBSTETRICS & GYNECOLOGY

## 2017-07-16 PROCEDURE — 700112 HCHG RX REV CODE 229: Performed by: OBSTETRICS & GYNECOLOGY

## 2017-07-16 PROCEDURE — 700102 HCHG RX REV CODE 250 W/ 637 OVERRIDE(OP): Performed by: OBSTETRICS & GYNECOLOGY

## 2017-07-16 RX ORDER — LANOLIN ALCOHOL/MO/W.PET/CERES
325 CREAM (GRAM) TOPICAL
Qty: 90 TAB | Refills: 1 | Status: SHIPPED | OUTPATIENT
Start: 2017-07-16

## 2017-07-16 RX ORDER — IBUPROFEN 800 MG/1
800 TABLET ORAL EVERY 8 HOURS PRN
Qty: 30 TAB | Refills: 1 | Status: SHIPPED | OUTPATIENT
Start: 2017-07-16

## 2017-07-16 RX ORDER — OXYCODONE HYDROCHLORIDE AND ACETAMINOPHEN 5; 325 MG/1; MG/1
1 TABLET ORAL EVERY 4 HOURS PRN
Qty: 30 TAB | Refills: 0 | Status: SHIPPED | OUTPATIENT
Start: 2017-07-16

## 2017-07-16 RX ADMIN — OXYCODONE HYDROCHLORIDE AND ACETAMINOPHEN 2 TABLET: 5; 325 TABLET ORAL at 04:57

## 2017-07-16 RX ADMIN — DOCUSATE SODIUM 100 MG: 100 CAPSULE ORAL at 09:00

## 2017-07-16 RX ADMIN — OXYCODONE HYDROCHLORIDE AND ACETAMINOPHEN 2 TABLET: 5; 325 TABLET ORAL at 09:00

## 2017-07-16 RX ADMIN — IBUPROFEN 600 MG: 600 TABLET, FILM COATED ORAL at 04:56

## 2017-07-16 ASSESSMENT — PAIN SCALES - GENERAL
PAINLEVEL_OUTOF10: 7
PAINLEVEL_OUTOF10: 0
PAINLEVEL_OUTOF10: 4

## 2017-07-16 NOTE — DISCHARGE SUMMARY
Discharge Summary:      Jackelyn Morrow      Admit Date:   2017  Discharge Date:  2017     Admitting diagnosis:  Pregnancy  Labor and delivery indication for care or intervention  P c/s w/ BTL for late decelerations and no cervical change  Discharge Diagnosis: Status post  for failure to progress.  Pregnancy Complications: PIH, Obesity  Tubal Ligation:  yes        History:  Past Medical History   Diagnosis Date   • Meningitis      BACTERIAL MENINGITIS @ 1 YRS OLD   • Abnormal Pap smear      COLPO & BIOPSY DONE. NEG RESULTS     OB History    Para Term  AB SAB TAB Ectopic Multiple Living   3 2 2       2      # Outcome Date GA Lbr [place]/2nd Weight Sex Delivery Anes PTL Lv   3 Current            2 Term 06/23/10 40w0d  3.515 kg (7 lb 12 oz) M Vag-Spont EPI N Y      Comments: Pt states no complications.    1 Term 02 40w0d  3.062 kg (6 lb 12 oz) M Vag-Spont EPI N Y      Comments: nno complications           Review of patient's allergies indicates no known allergies.  Patient Active Problem List    Diagnosis Date Noted   • Multigravida of advanced maternal age 2017   • Encounter for supervision of other normal pregnancy 2010        Hospital Course:   37 y.o. , now para 3, was admitted with the above mentioned diagnosis, underwent Induction of Labor,  for failure to progress. Patient postpartum course was unremarkable, with progressive advancement in diet , ambulation and toleration of oral analgesia. Patient without complaints today and desires discharge.      Filed Vitals:    17 0800 17 1933 07/15/17 0800 07/15/17 2000   BP: 129/77 122/75 132/67 138/85   Pulse: 74 90 88 96   Temp: 36.9 °C (98.5 °F) 36.8 °C (98.2 °F) 36.3 °C (97.4 °F) 36.9 °C (98.4 °F)   TempSrc:       Resp: 16 18 20 18   Height:       Weight:       SpO2: 95% 95% 98% 98%       Current Facility-Administered Medications   Medication Dose   • acetaminophen (TYLENOL) tablet  325 mg  325 mg   • lactated ringers infusion     • LR infusion     • PRN oxytocin (PITOCIN) (20 Units/1000 mL) PRN for excessive uterine bleeding - See Admin Instr  125-999 mL/hr   • misoprostol (CYTOTEC) tablet 800 mcg  800 mcg   • docusate sodium (COLACE) capsule 100 mg  100 mg   • simethicone (MYLICON) chewable tab 80 mg  80 mg   • ibuprofen (MOTRIN) tablet 600 mg  600 mg   • oxycodone-acetaminophen (PERCOCET) 5-325 MG per tablet 1 Tab  1 Tab   • oxycodone-acetaminophen (PERCOCET) 5-325 MG per tablet 2 Tab  2 Tab   • ondansetron (ZOFRAN) syringe/vial injection 4 mg  4 mg    Or   • ondansetron (ZOFRAN ODT) dispertab 4 mg  4 mg   • morphine (pf) 4 mg/ml injection 4 mg  4 mg   • oxytocin (PITOCIN) infusion (for postpartum)   mL/hr   • hydrALAZINE (APRESOLINE) injection 5-10 mg  5-10 mg    Or   • labetalol (NORMODYNE,TRANDATE) injection 20-40 mg  20-40 mg       Exam:  Breast Exam: Inspection negative. No nipple discharge or bleeding. No masses or nodularity palpable  Abdomen: Abdomen soft, non-tender. BS normal. No masses,  No organomegaly  Fundus Non Tender: yes  Incision: dry and intact, healing well with good reapproximation, no evidence of infection, separation or keloid formation.  Perineum: perineum intact  Extremity: extremities, peripheral pulses and reflexes normal, trace -+1 edema     Labs:  Recent Labs      07/14/17   0349   WBC  14.7*   RBC  2.96*   HEMOGLOBIN  9.3*   HEMATOCRIT  28.4*   MCV  95.9   MCH  31.4   MCHC  32.7*   RDW  49.2   PLATELETCT  111*   MPV  10.9        Activity:   Discharge to home  Pelvic Rest x 6 weeks    Assessment:  Course complicated by PIH, Anemia   Discharge Assessment: No heavy bleeding or foul vaginal discharge      Follow up: .[facility] or [facility] in 5 weeks for vaginal ; 1 week for incision check.      Discharge Meds:   Current Outpatient Prescriptions   Medication Sig Dispense Refill   • ibuprofen (MOTRIN) 800 MG Tab Take 1 Tab by mouth every 8 hours as needed  for Moderate Pain (For cramping after delivery; do not give if patient is receiving ketorolac (Toradol)). 30 Tab 1   • oxycodone-acetaminophen (PERCOCET) 5-325 MG Tab Take 1 Tab by mouth every four hours as needed for Severe Pain (for Moderate Pain (Pain Scale 4-6) after delivery). 30 Tab 0       Johny Lara M.D.

## 2017-07-16 NOTE — CARE PLAN
Problem: Potential knowledge deficit related to lack of understanding of self and  care  Goal: Patient will verbalize understanding of self and infant care  Patient verbalizes understanding of self and infant care.    Intervention: Assess patient and knowledge of self and infant care  Educated patient on self and infant care.  Received verbal understanding.  Intervention: Educate patient verbally, by demonstration, and with written and video material  Patient provided with written material.  Reviewed use of [email or web address] video system for additional information.  Received verbal understanding.    Goal: Patient will demonstrate ability to care for self and infant  Patient demonstrates appropriate breastfeeding technique.  Patient demonstrates appropriate technique when changing infant's diaper.    Problem: Potential anxiety related to difficulty adapting to parental role  Goal: Patient will verbalize and demonstrate effective bonding and parenting behavior  Patient demonstrates effective bonding and parenting behaviors.  Patient breastfeeding infant.  Able to change diapers as needed.  Intervention: Assess patient for anxiety or apprehension regarding parenting role  Patient does not display anxiety or apprehension regarding parenting role.

## 2017-07-16 NOTE — DISCHARGE INSTRUCTIONS
POSTPARTUM DISCHARGE INSTRUCTIONS FOR MOM    YOB: 1980   Age: 37 y.o.               Admit Date: 2017     Discharge Date: 2017  Attending Doctor:  Alexsandra Villalobos M.D.                  Allergies:  Review of patient's allergies indicates no known allergies.    Discharged to home by car. Discharged via wheelchair, hospital escort: Yes.  Special equipment needed: Not Applicable  Belongings with: Personal  Be sure to schedule a follow-up appointment with your primary care doctor or any specialists as instructed.     Discharge Plan:   Diet Plan: Discussed  Activity Level: Discussed  Confirmed Follow up Appointment: Patient to Call and Schedule Appointment  Confirmed Symptoms Management: Discussed  Medication Reconciliation Updated: Yes  Influenza Vaccine Indication: Indicated: Not available from distributor/    REASONS TO CALL YOUR OBSTETRICIAN:  1.   Persistent fever or shaking chills (Temperature higher than 100.4)  2.   Heavy bleeding (soaking more than 1 pad per hour); Passing clots  3.   Foul odor from vagina  4.   Mastitis (Breast infection; breast pain, chills, fever, redness)  5.   Urinary pain, burning or frequency  6.   Episiotomy infection  7.   Abdominal incision infection  8.   Severe depression longer than 24 hours    HAND WASHING  · Prior to handling the baby.  · Before breastfeeding or bottle feeding baby.  · After using the bathroom or changing the baby's diaper.    WOUND CARE  Ask your physician for additional care instructions.  In general:    ·  Incision:      · Keep clean and dry.    · Do NOT lift anything heavier than your baby for up to 6 weeks.    · There should not be any opening or pus.      VAGINAL CARE  · Nothing inside vagina for 6 weeks: no sexual intercourse, tampons or douching.  · Bleeding may continue for 2-4 weeks.  Amount may vary.    · Call your physician for heavy bleeding which means soaking more than 1 pad per hour    BIRTH CONTROL  · It  "is possible to become pregnant at any time after delivery and while breastfeeding.  · Plan to discuss a method of birth control with your physician at your follow up visit. visit.    DIET AND ELIMINATION  · Eating more fiber (bran cereal, fruits, and vegetables) and drinking plenty of fluids will help to avoid constipation.  · Urinary frequency after childbirth is normal.    POSTPARTUM BLUES  During the first few days after birth, you may experience a sense of the \"blues\" which may include impatience, irritability or even crying.  These feeling come and go quickly.  However, as many as 1 in 10 women experience emotional symptoms known as postpartum depression.    Postpartum depression:  May start as early as the second or third day after delivery or take several weeks or months to develop.  Symptoms of \"blues\" are present, but are more intense:  Crying spells; loss of appetite; feelings of hopelessness or loss of control; fear of touching the baby; over concern or no concern at all about the baby; little or no concern about your own appearance/caring for yourself; and/or inability to sleep or excessive sleeping.  Contact your physician if you are experiencing any of these symptoms.    Crisis Hotline:  · [place] Crisis Hotline:  0-753-KVCWGXJ  Or 1-922.631.9879  · [place] Crisis Hotline:  1-695.579.6819  Or 298-668-4971    PREVENTING SHAKEN BABY:  If you are angry or stressed, PUT THE BABY IN THE CRIB, step away, take some deep breaths, and wait until you are calm to care for the baby.  DO NOT SHAKE THE BABY.  You are not alone, call a supporter for help.    · Crisis Call Center 24/7 crisis line 954-760-6782 or 1-206.196.7367  · You can also text them, text \"ANSWER\" to 301490    QUIT SMOKING/TOBACCO USE:  I understand the use of any tobacco products increases my chance of suffering from future heart disease and could cause other illnesses which may shorten my life. Quitting the use of tobacco products is the single " most important thing I can do to improve my health. For further information on smoking / tobacco cessation call a Toll Free Quit Line at 1-929.797.7161 (*National [employer] [place]) or 1-196.881.6840 (American Lung Association) or you can access the web based program at www.lungusa.org.    · [place] Tobacco Users Help Line:  (144) 249-1412       Toll Free: 1-180.740.2417  · Quit Tobacco Program [facility] (043)394-3091    DEPRESSION / SUICIDE RISK:  As you are discharged from this [facility], it is important to learn how to keep safe from harming yourself.    Recognize the warning signs:  · Abrupt changes in personality, positive or negative- including increase in energy   · Giving away possessions  · Change in eating patterns- significant weight changes-  positive or negative  · Change in sleeping patterns- unable to sleep or sleeping all the time   · Unwillingness or inability to communicate  · Depression  · Unusual sadness, discouragement and loneliness  · Talk of wanting to die  · Neglect of personal appearance   · Rebelliousness- reckless behavior  · Withdrawal from people/activities they love  · Confusion- inability to concentrate     If you or a loved one observes any of these behaviors or has concerns about self-harm, here's what you can do:  · Talk about it- your feelings and reasons for harming yourself  · Remove any means that you might use to hurt yourself (examples: pills, rope, extension cords, firearm)  · Get professional help from the community (Mental Health, Substance Abuse, psychological counseling)  · Do not be alone:Call your Safe Contact- someone whom you trust who will be there for you.  · Call your local CRISIS HOTLINE 295-6755 or 892-509-8513  · Call your local Children's Mobile Crisis Response Team [place] (165) 667-5768 or www.Deem  · Call the toll free National Suicide Prevention Hotlines   · National Suicide Prevention Lifeline  470-109-TWAO (6526)  · [facility] 800-SUICIDE (832-7953)    DISCHARGE SURVEY:  Thank you for choosing [facility].  We hope we provided you with very good care.  You may be receiving a survey in the mail.  Please fill it out.  Your opinion is valuable to us.    ADDITIONAL EDUCATIONAL MATERIALS GIVEN TO PATIENT:        My signature on this form indicates that:  1.  I have reviewed and understand the above information  2.  My questions regarding this information have been answered to my satisfaction.  3.  I have formulated a plan with my discharge nurse to obtain my prescribed medication for home.

## 2017-07-16 NOTE — PROGRESS NOTES
Mother reports baby breastfeeding well &  previous babies 1st for 9 months, 2nd for 3 months. Baby asleep on mother's chest at this time. Mother states, already received the Breastfeeding Essentials pamphlet. Mother feels comfortable with breastfeeding & going home today. Breastfeeding POC, BF Q 2-3 hours (on demand) & monitor for appropriate voids & stools.

## 2017-07-16 NOTE — PROGRESS NOTES
Discharge orders received.  Instructions, prescriptions and education given to patient.  Follow up appointments discussed.  Patient verbalized understanding of dc instructions and prescriptions.  Patient signed dc instructions.  Patient verbalized she had all belongings with them.  Patient left via walking with family to home with infant.  Wished patient a pleasant day.

## 2017-07-16 NOTE — PROGRESS NOTES
Discharge orders received. Paperwork reviewed and signed. Cuddles and clamp removed. Carseat checked. Pt escorted off floor with staff

## 2017-07-16 NOTE — CARE PLAN
Problem: Potential for postpartum infection related to surgical incision, compromised uterine condition, urinary tract or respiratory compromise  Goal: Patient will be afebrile and free from signs and symptoms of infection  Outcome: PROGRESSING AS EXPECTED  Afebrile with incision clean,dry, approximated and open to air.    Problem: Alteration in comfort related to surgical incision and/or after birth pains  Goal: Patient is able to ambulate, care for self and infant with acceptable pain level  Outcome: PROGRESSING AS EXPECTED  Ambulating  and voiding without difficulty.  Goal: Patient verbalizes acceptable pain level  Outcome: PROGRESSING AS EXPECTED  Verbalizes acceptable pain relief with pain medication being given as requested.

## 2017-07-20 ENCOUNTER — POST PARTUM (OUTPATIENT)
Dept: OBGYN | Facility: CLINIC | Age: 37
End: 2017-07-20
Payer: MEDICAID

## 2017-07-20 VITALS
SYSTOLIC BLOOD PRESSURE: 124 MMHG | WEIGHT: 200 LBS | DIASTOLIC BLOOD PRESSURE: 84 MMHG | HEIGHT: 63 IN | BODY MASS INDEX: 35.44 KG/M2

## 2017-07-20 DIAGNOSIS — Z51.89 VISIT FOR WOUND CHECK: ICD-10-CM

## 2017-07-20 DIAGNOSIS — Z98.891 STATUS POST PRIMARY LOW TRANSVERSE CESAREAN SECTION: ICD-10-CM

## 2017-07-20 PROCEDURE — 99024 POSTOP FOLLOW-UP VISIT: CPT | Performed by: OBSTETRICS & GYNECOLOGY

## 2017-07-20 NOTE — MR AVS SNAPSHOT
"Jackelyn Morrow   2017 11:00 AM   Post Partum   MRN: [id number]    [facility]   Dept Phone:  [phone]    Description:  Female : 1980   Provider:  Apryl Rogers M.D.           Allergies as of 2017     No Known Allergies      You were diagnosed with     Visit for wound check   [[id number]]       Status post primary low transverse  section   [577273]         Vital Signs     Blood Pressure Height Weight Body Mass Index Last Menstrual Period Smoking Status    124/84 mmHg 1.6 m (5' 2.99\") 90.719 kg (200 lb) 35.44 kg/m2 2016 Former Smoker      Basic Information     Date Of Birth Sex Race Ethnicity Preferred Language    1980 Female White Unknown English      Your appointments     Aug 16, 2017 10:00 AM   Post Partum with MICHELLE Kimbrough   [facility] [address]  [facility] [id number] [phone]              Problem List              ICD-10-CM Priority Class Noted - Resolved    Encounter for supervision of other normal pregnancy Z34.80   2010 - Present    Multigravida of advanced maternal age O09.529   2017 - Present      Health Maintenance        Date Due Completion Dates    IMM INFLUENZA (1) 2017 ---    PAP SMEAR 2020    IMM DTaP/Tdap/Td Vaccine (3 - Td) 2027, 2010            Current Immunizations     Tdap Vaccine 2017  2:51 PM, 2010  6:00 PM      Below and/or attached are the medications your provider expects you to take. Review all of your home medications and newly ordered medications with your provider and/or pharmacist. Follow medication instructions as directed by your provider and/or pharmacist. Please keep your medication list with you and share with your provider. Update the information when medications are discontinued, doses are changed, or new medications (including over-the-counter products) are added; and carry medication information at " all times in the event of emergency situations     Allergies:  No Known Allergies          Medications  Valid as of: July 20, 2017 - 12:53 PM    Generic Name Brand Name Tablet Size Instructions for use    Ferrous Sulfate (Tablet Delayed Response) ferrous sulfate 325 (65 FE) MG Take 1 Tab by mouth 3 times a day, with meals.        Ibuprofen (Tab) MOTRIN 800 MG Take 1 Tab by mouth every 8 hours as needed for Moderate Pain (For cramping after delivery; do not give if patient is receiving ketorolac (Toradol)).        Oxycodone-Acetaminophen (Tab) PERCOCET 5-325 MG Take 1 Tab by mouth every four hours as needed for Severe Pain (for Moderate Pain (Pain Scale 4-6) after delivery).        Prenatal Vit w/Qa-Hdvlqdvzh-CA (Tab) PNV 27-0.6-0.4 MG Take 1 Tab by mouth. Resume unchanged        .                 Medicines prescribed today were sent to:     [facility] [address] [place] [address] [place], [place] - [address] AT [facility] [address]    [address] [place] [address]    Phone: [phone] Fax: [phone]    Open 24 Hours?: No      Medication refill instructions:       If your prescription bottle indicates you have medication refills left, it is not necessary to call your provider’s office. Please contact your pharmacy and they will refill your medication.    If your prescription bottle indicates you do not have any refills left, you may request refills at any time through one of the following ways: The online [employer] system (except Urgent Care), by calling your provider’s office, or by asking your pharmacy to contact your provider’s office with a refill request. Medication refills are processed only during regular business hours and may not be available until the next business day. Your provider may request additional information or to have a follow-up visit with you prior to refilling your medication.   *Please Note: Medication refills are assigned a new Rx number when refilled electronically. Your pharmacy may  indicate that no refills were authorized even though a new prescription for the same medication is available at the pharmacy. Please request the medicine by name with the pharmacy before contacting your provider for a refill.        Other Notes About Your Plan     Normal pnp, normal glucose, neg GBS. Normal US Baby Elidia           MyChart Status: Patient Declined

## 2017-07-20 NOTE — PROGRESS NOTES
"CC= WOUND CHECK    History of present illness:   37 y.o.  S/P PRIMARY LTCS + BTL presents for wound check  Doing well  Pain is well controlled  No VB  (+) breast feeding    Review of systems:  Pertinent positives documented in HPI and all other systems reviewed & are negative    All PMH, PSH, allergies, social history and FH reviewed and updated today:  Past Medical History   Diagnosis Date   • Meningitis      BACTERIAL MENINGITIS @ 1 YRS OLD   • Abnormal Pap smear      COLPO & BIOPSY DONE. NEG RESULTS       Past Surgical History   Procedure Laterality Date   • Abdominal exploration  2016   • Cholecystectomy     • Adenoidectomy     • Primary c section with tubal ligation  2017     Procedure: PRIMARY C SECTION WITH TUBAL LIGATION;  Surgeon: Apryl Rogers M.D.;  Location: LABOR AND DELIVERY;  Service:        Allergies: No Known Allergies    Social History     Social History   • Marital Status: Single     Spouse Name: N/A   • Number of Children: N/A   • Years of Education: N/A     Occupational History   • Not on file.     Social History Main Topics   • Smoking status: Former Smoker -- 15 years     Types: Cigarettes     Quit date: 2016   • Smokeless tobacco: Not on file      Comment: Pt states stopped with pregnancy.    • Alcohol Use: No      Comment: occasionally   • Drug Use: No   • Sexual Activity:     Partners: Male      Comment: none     Other Topics Concern   • Not on file     Social History Narrative       Family History   Problem Relation Age of Onset   • Hypertension Maternal Grandfather      HIGH BP   • Hypertension Paternal Grandfather      HIGH BP       Physical exam:  Blood pressure 124/84, height 1.6 m (5' 2.99\"), weight 90.719 kg (200 lb), last menstrual period 2016.    General:appears stated age, is in no apparent distress, is well developed and well nourished  Abdomen: Bowel sounds positive, nondistended, soft, nontender x4, no rebound or guarding. No " organomegaly. No masses.  INCISION - DRY/CLEAN/INTACT  Skin: No rashes, or ulcers or lesions seen  Psychiatric: Patient shows appropriate affect, is alert and oriented x3, intact judgment and insight.  1. Visit for wound check     2. Status post primary low transverse  section wtih BTL     3. Stable  4. Continue to breast feed  5. PNV  6. Ferrous  7. 6 week PP visit

## 2017-08-16 ENCOUNTER — POST PARTUM (OUTPATIENT)
Dept: OBGYN | Facility: CLINIC | Age: 37
End: 2017-08-16
Payer: MEDICAID

## 2017-08-16 VITALS — WEIGHT: 199 LBS | SYSTOLIC BLOOD PRESSURE: 118 MMHG | DIASTOLIC BLOOD PRESSURE: 66 MMHG | BODY MASS INDEX: 35.26 KG/M2

## 2017-08-16 PROBLEM — O09.529 MULTIGRAVIDA OF ADVANCED MATERNAL AGE: Status: RESOLVED | Noted: 2017-06-22 | Resolved: 2017-08-16

## 2017-08-16 PROCEDURE — 90050 PR POSTPARTUM VISIT: CPT | Performed by: PHYSICIAN ASSISTANT

## 2017-08-16 NOTE — PROGRESS NOTES
Subjective:   Jackelyn Morrow is a 37 y.o. female who presents with postpartum visit today. 5wk s/p primary C/S for FTP, NRFHT without complications. Pt has no complaints- denies heavy vaginal bleeding, depression, intercourse, pain or problems with BF. PAP wnl  - repeat . BCM desired is BTL, which was done at time of surgery. Risks d/w pt.          Other        ROS       Objective:     /66 mmHg  Wt 90.266 kg (199 lb)  LMP 2016  Breastfeeding? Yes     Physical Exam   Constitutional: She appears well-developed and well-nourished.   HENT:   Head: Normocephalic and atraumatic.   Eyes: Pupils are equal, round, and reactive to light.   Neck: Normal range of motion. Neck supple. No thyromegaly present.   Cardiovascular: Normal rate, regular rhythm and normal heart sounds.    Pulmonary/Chest: Effort normal and breath sounds normal. No respiratory distress.   Abdominal: Soft. Bowel sounds are normal. She exhibits no distension. There is no tenderness.   Genitourinary: Vagina normal and uterus normal. Pelvic exam was performed with patient supine. There is no rash or tenderness on the right labia. There is no rash or tenderness on the left labia. Uterus is not deviated, not enlarged and not tender. Cervix exhibits no motion tenderness. Right adnexum displays no mass and no tenderness. Left adnexum displays no mass and no tenderness. No erythema in the vagina. No foreign body around the vagina. No signs of injury around the vagina. No vaginal discharge found.   Neurological: She is alert. She has normal reflexes.   Skin: Skin is warm and dry. No erythema.   Psychiatric: She has a normal mood and affect. Her behavior is normal. Thought content normal.   Vitals reviewed.              Assessment/Plan:     1. Postpartum care following  delivery  BTL done at time of surgery, risks d/w pt. PAP wnl  - repeat in 3 yrs.

## 2017-08-16 NOTE — MR AVS SNAPSHOT
Jackelyn Morrow   2017 10:00 AM   Post Partum   MRN: [id number]    Department[facility]   Dept Phone:  [phone]    Description:  Female : 1980   Provider:  MICHELLE Kimbrough           Reason for Visit     Other Postpartum Exam      Allergies as of 2017     No Known Allergies      You were diagnosed with     Postpartum care following  delivery   [[id number]]         Vital Signs     Blood Pressure Weight Last Menstrual Period Breastfeeding? Smoking Status       118/66 mmHg 90.266 kg (199 lb) 2016 Yes Former Smoker       Basic Information     Date Of Birth Sex Race Ethnicity Preferred Language    1980 Female White Unknown English      Problem List              ICD-10-CM Priority Class Noted - Resolved    Postpartum care following  delivery Z39.2   2017 - Present      Health Maintenance        Date Due Completion Dates    IMM INFLUENZA (1) 2017 ---    PAP SMEAR 2020    IMM DTaP/Tdap/Td Vaccine (3 - Td) 2027, 2010            Current Immunizations     Tdap Vaccine 2017  2:51 PM, 2010  6:00 PM      Below and/or attached are the medications your provider expects you to take. Review all of your home medications and newly ordered medications with your provider and/or pharmacist. Follow medication instructions as directed by your provider and/or pharmacist. Please keep your medication list with you and share with your provider. Update the information when medications are discontinued, doses are changed, or new medications (including over-the-counter products) are added; and carry medication information at all times in the event of emergency situations     Allergies:  No Known Allergies          Medications  Valid as of: 2017 - 10:41 AM    Generic Name Brand Name Tablet Size Instructions for use    Ferrous Sulfate (Tablet Delayed Response) ferrous sulfate 325 (65 FE) MG Take 1 Tab by mouth 3 times  a day, with meals.        Ibuprofen (Tab) MOTRIN 800 MG Take 1 Tab by mouth every 8 hours as needed for Moderate Pain (For cramping after delivery; do not give if patient is receiving ketorolac (Toradol)).        Oxycodone-Acetaminophen (Tab) PERCOCET 5-325 MG Take 1 Tab by mouth every four hours as needed for Severe Pain (for Moderate Pain (Pain Scale 4-6) after delivery).        Prenatal Vit w/Ql-Wulykuubr-VJ (Tab) PNV 27-0.6-0.4 MG Take 1 Tab by mouth. Resume unchanged        .                 Medicines prescribed today were sent to:     [facility] [id number]  [place], [place] - [address] [facility] [address]    [address] [place] [address]    Phone: [phone] Fax: [phone]    Open 24 Hours?: No      Medication refill instructions:       If your prescription bottle indicates you have medication refills left, it is not necessary to call your provider’s office. Please contact your pharmacy and they will refill your medication.    If your prescription bottle indicates you do not have any refills left, you may request refills at any time through one of the following ways: The online [employer] system (except Urgent Care), by calling your provider’s office, or by asking your pharmacy to contact your provider’s office with a refill request. Medication refills are processed only during regular business hours and may not be available until the next business day. Your provider may request additional information or to have a follow-up visit with you prior to refilling your medication.   *Please Note: Medication refills are assigned a new Rx number when refilled electronically. Your pharmacy may indicate that no refills were authorized even though a new prescription for the same medication is available at the pharmacy. Please request the medicine by name with the pharmacy before contacting your provider for a refill.           [employer] Access Code: Activation code not generated  Current [employer]  Status: Active

## 2017-08-16 NOTE — NON-PROVIDER
Pt here today for postpartum exam.  Operation Date with BTL: 07/13/2017  Currently breast and formula feeding  LMP: not yet  WT: 199 lb  BP: 118/66  Pt states feeling incision soreness. States no other complaints.   Good ph: [phone]

## 2025-06-10 NOTE — PROGRESS NOTES
Pt. here for Ob f/u and GBS today. Good # [phone]  Good FM  Pt states having contractions that come and go.   Pharmacy verified.      Yes

## (undated) DEVICE — TUBING CLEARLINK DUO-VENT - C-FLO (48EA/CA)

## (undated) DEVICE — SET EXTENSION WITH 2 PORTS (48EA/CA) ***PART #2C8610 IS A SUBSTITUTE*****

## (undated) DEVICE — WATER IRRIG. STER. 1500 ML - (9/CA)

## (undated) DEVICE — SUTURE 1 CHROMIC GUTCT-1 27 (36PK/BX)"

## (undated) DEVICE — CATHETER IV NON-SAFETY 18 GA X 1 1/4 (50/BX 4BX/CA)

## (undated) DEVICE — ELECTRODE DUAL RETURN W/ CORD - (50/PK)

## (undated) DEVICE — PACK C-SECTION (2EA/CA)

## (undated) DEVICE — CLIPS FILSHIE FOR TUBAL - LIGATION SYSTEM (20PR/BX)

## (undated) DEVICE — HEAD HOLDER JUNIOR/ADULT

## (undated) DEVICE — TRAY SPINAL ANESTHESIA NON-SAFETY (10/CA)

## (undated) DEVICE — SUTUREABS02-0 CT1 27IN - (36EA/BX)

## (undated) DEVICE — KIT  I.V. START (100EA/CA)

## (undated) DEVICE — SUTURE 0 VICRYL PLUS CT-1 - 36 INCH (36/BX)

## (undated) DEVICE — DETERGENT RENUZYME PLUS 10 OZ PACKET (50/BX)

## (undated) DEVICE — GLOVE BIOGEL SZ 6.5 SURGICAL PF LTX (50PR/BX 4BX/CA)

## (undated) DEVICE — SODIUM CHL IRRIGATION 0.9% 1000ML (12EA/CA)

## (undated) DEVICE — STAPLER SKIN DISP - (6/BX 10BX/CA) VISISTAT

## (undated) DEVICE — SUTURE 3-0 VICRYL PLUS CT-1 - 36 INCH (36/BX)